# Patient Record
Sex: MALE | Race: WHITE | NOT HISPANIC OR LATINO | Employment: FULL TIME | URBAN - METROPOLITAN AREA
[De-identification: names, ages, dates, MRNs, and addresses within clinical notes are randomized per-mention and may not be internally consistent; named-entity substitution may affect disease eponyms.]

---

## 2021-11-08 ENCOUNTER — APPOINTMENT (OUTPATIENT)
Dept: GENERAL RADIOLOGY | Facility: HOSPITAL | Age: 41
End: 2021-11-08

## 2021-11-08 ENCOUNTER — HOSPITAL ENCOUNTER (INPATIENT)
Facility: HOSPITAL | Age: 41
LOS: 8 days | Discharge: HOME | End: 2021-11-16
Attending: EMERGENCY MEDICINE | Admitting: INTERNAL MEDICINE

## 2021-11-08 ENCOUNTER — APPOINTMENT (OUTPATIENT)
Dept: CT IMAGING | Facility: HOSPITAL | Age: 41
End: 2021-11-08

## 2021-11-08 DIAGNOSIS — Z87.891 FORMER SMOKER: ICD-10-CM

## 2021-11-08 DIAGNOSIS — U07.1 PNEUMONIA DUE TO COVID-19 VIRUS: Primary | ICD-10-CM

## 2021-11-08 DIAGNOSIS — I10 PRIMARY HYPERTENSION: ICD-10-CM

## 2021-11-08 DIAGNOSIS — J96.01 ACUTE RESPIRATORY FAILURE WITH HYPOXIA (HCC): ICD-10-CM

## 2021-11-08 DIAGNOSIS — J12.82 PNEUMONIA DUE TO COVID-19 VIRUS: Primary | ICD-10-CM

## 2021-11-08 DIAGNOSIS — R73.03 PREDIABETES: ICD-10-CM

## 2021-11-08 DIAGNOSIS — D89.839 CYTOKINE RELEASE SYNDROME, GRADE UNSPECIFIED: ICD-10-CM

## 2021-11-08 PROBLEM — E66.9 CLASS 2 OBESITY IN ADULT: Status: ACTIVE | Noted: 2021-11-08

## 2021-11-08 LAB
ALBUMIN SERPL-MCNC: 3.6 G/DL (ref 3.5–5.2)
ALBUMIN SERPL-MCNC: 4 G/DL (ref 3.5–5.2)
ALBUMIN/GLOB SERPL: 1.4 G/DL
ALP SERPL-CCNC: 64 U/L (ref 39–117)
ALP SERPL-CCNC: 74 U/L (ref 39–117)
ALT SERPL W P-5'-P-CCNC: 33 U/L (ref 1–41)
ALT SERPL W P-5'-P-CCNC: 35 U/L (ref 1–41)
ANION GAP SERPL CALCULATED.3IONS-SCNC: 15.5 MMOL/L (ref 5–15)
AST SERPL-CCNC: 39 U/L (ref 1–40)
AST SERPL-CCNC: 45 U/L (ref 1–40)
B PARAPERT DNA SPEC QL NAA+PROBE: NOT DETECTED
B PERT DNA SPEC QL NAA+PROBE: NOT DETECTED
BASOPHILS # BLD AUTO: 0.04 10*3/MM3 (ref 0–0.2)
BASOPHILS NFR BLD AUTO: 0.3 % (ref 0–1.5)
BILIRUB CONJ SERPL-MCNC: 0.2 MG/DL (ref 0–0.3)
BILIRUB INDIRECT SERPL-MCNC: 0.2 MG/DL
BILIRUB SERPL-MCNC: 0.4 MG/DL (ref 0–1.2)
BILIRUB SERPL-MCNC: 0.4 MG/DL (ref 0–1.2)
BUN SERPL-MCNC: 12 MG/DL (ref 6–20)
BUN/CREAT SERPL: 17.9 (ref 7–25)
C PNEUM DNA NPH QL NAA+NON-PROBE: NOT DETECTED
CALCIUM SPEC-SCNC: 8.7 MG/DL (ref 8.6–10.5)
CHLORIDE SERPL-SCNC: 104 MMOL/L (ref 98–107)
CO2 SERPL-SCNC: 23.5 MMOL/L (ref 22–29)
CREAT SERPL-MCNC: 0.67 MG/DL (ref 0.76–1.27)
CREAT SERPL-MCNC: 0.81 MG/DL (ref 0.76–1.27)
CRP SERPL-MCNC: 7.91 MG/DL (ref 0–0.5)
D DIMER PPP FEU-MCNC: 0.36 MCGFEU/ML (ref 0–0.49)
D-LACTATE SERPL-SCNC: 1.7 MMOL/L (ref 0.5–2)
D-LACTATE SERPL-SCNC: 2.5 MMOL/L (ref 0.5–2)
DEPRECATED RDW RBC AUTO: 41.3 FL (ref 37–54)
EOSINOPHIL # BLD AUTO: 0 10*3/MM3 (ref 0–0.4)
EOSINOPHIL NFR BLD AUTO: 0 % (ref 0.3–6.2)
ERYTHROCYTE [DISTWIDTH] IN BLOOD BY AUTOMATED COUNT: 12.6 % (ref 12.3–15.4)
FERRITIN SERPL-MCNC: 1577 NG/ML (ref 30–400)
FLUAV SUBTYP SPEC NAA+PROBE: NOT DETECTED
FLUBV RNA ISLT QL NAA+PROBE: NOT DETECTED
GFR SERPL CREATININE-BSD FRML MDRD: 105 ML/MIN/1.73
GFR SERPL CREATININE-BSD FRML MDRD: 130 ML/MIN/1.73
GLOBULIN UR ELPH-MCNC: 2.9 GM/DL
GLUCOSE SERPL-MCNC: 140 MG/DL (ref 65–99)
HADV DNA SPEC NAA+PROBE: NOT DETECTED
HCOV 229E RNA SPEC QL NAA+PROBE: NOT DETECTED
HCOV HKU1 RNA SPEC QL NAA+PROBE: NOT DETECTED
HCOV NL63 RNA SPEC QL NAA+PROBE: NOT DETECTED
HCOV OC43 RNA SPEC QL NAA+PROBE: NOT DETECTED
HCT VFR BLD AUTO: 46.7 % (ref 37.5–51)
HGB BLD-MCNC: 15.6 G/DL (ref 13–17.7)
HMPV RNA NPH QL NAA+NON-PROBE: NOT DETECTED
HPIV1 RNA SPEC QL NAA+PROBE: NOT DETECTED
HPIV2 RNA SPEC QL NAA+PROBE: NOT DETECTED
HPIV3 RNA NPH QL NAA+PROBE: NOT DETECTED
HPIV4 P GENE NPH QL NAA+PROBE: NOT DETECTED
IMM GRANULOCYTES # BLD AUTO: 0.26 10*3/MM3 (ref 0–0.05)
IMM GRANULOCYTES NFR BLD AUTO: 1.6 % (ref 0–0.5)
LDH SERPL-CCNC: 563 U/L (ref 135–225)
LYMPHOCYTES # BLD AUTO: 1.45 10*3/MM3 (ref 0.7–3.1)
LYMPHOCYTES NFR BLD AUTO: 9.1 % (ref 19.6–45.3)
M PNEUMO IGG SER IA-ACNC: NOT DETECTED
MCH RBC QN AUTO: 29.7 PG (ref 26.6–33)
MCHC RBC AUTO-ENTMCNC: 33.4 G/DL (ref 31.5–35.7)
MCV RBC AUTO: 89 FL (ref 79–97)
MONOCYTES # BLD AUTO: 0.93 10*3/MM3 (ref 0.1–0.9)
MONOCYTES NFR BLD AUTO: 5.8 % (ref 5–12)
NEUTROPHILS NFR BLD AUTO: 13.31 10*3/MM3 (ref 1.7–7)
NEUTROPHILS NFR BLD AUTO: 83.2 % (ref 42.7–76)
NRBC BLD AUTO-RTO: 0.1 /100 WBC (ref 0–0.2)
NT-PROBNP SERPL-MCNC: 134 PG/ML (ref 0–450)
PLATELET # BLD AUTO: 329 10*3/MM3 (ref 140–450)
PMV BLD AUTO: 9.3 FL (ref 6–12)
POTASSIUM SERPL-SCNC: 3.6 MMOL/L (ref 3.5–5.2)
PROCALCITONIN SERPL-MCNC: 0.04 NG/ML (ref 0–0.25)
PROCALCITONIN SERPL-MCNC: 0.07 NG/ML (ref 0–0.25)
PROT SERPL-MCNC: 6.4 G/DL (ref 6–8.5)
PROT SERPL-MCNC: 6.9 G/DL (ref 6–8.5)
QT INTERVAL: 353 MS
RBC # BLD AUTO: 5.25 10*6/MM3 (ref 4.14–5.8)
RHINOVIRUS RNA SPEC NAA+PROBE: NOT DETECTED
RSV RNA NPH QL NAA+NON-PROBE: NOT DETECTED
SARS-COV-2 RNA NPH QL NAA+NON-PROBE: DETECTED
SODIUM SERPL-SCNC: 143 MMOL/L (ref 136–145)
TROPONIN T SERPL-MCNC: <0.01 NG/ML (ref 0–0.03)
TROPONIN T SERPL-MCNC: <0.01 NG/ML (ref 0–0.03)
WBC # BLD AUTO: 15.99 10*3/MM3 (ref 3.4–10.8)

## 2021-11-08 PROCEDURE — 84145 PROCALCITONIN (PCT): CPT | Performed by: NURSE PRACTITIONER

## 2021-11-08 PROCEDURE — 86140 C-REACTIVE PROTEIN: CPT | Performed by: NURSE PRACTITIONER

## 2021-11-08 PROCEDURE — 71275 CT ANGIOGRAPHY CHEST: CPT

## 2021-11-08 PROCEDURE — 99285 EMERGENCY DEPT VISIT HI MDM: CPT

## 2021-11-08 PROCEDURE — 93005 ELECTROCARDIOGRAM TRACING: CPT | Performed by: EMERGENCY MEDICINE

## 2021-11-08 PROCEDURE — 85379 FIBRIN DEGRADATION QUANT: CPT | Performed by: NURSE PRACTITIONER

## 2021-11-08 PROCEDURE — 84145 PROCALCITONIN (PCT): CPT | Performed by: EMERGENCY MEDICINE

## 2021-11-08 PROCEDURE — 36415 COLL VENOUS BLD VENIPUNCTURE: CPT

## 2021-11-08 PROCEDURE — 85025 COMPLETE CBC W/AUTO DIFF WBC: CPT | Performed by: EMERGENCY MEDICINE

## 2021-11-08 PROCEDURE — 84484 ASSAY OF TROPONIN QUANT: CPT | Performed by: EMERGENCY MEDICINE

## 2021-11-08 PROCEDURE — 25010000002 ONDANSETRON PER 1 MG: Performed by: INTERNAL MEDICINE

## 2021-11-08 PROCEDURE — 63710000001 DEXAMETHASONE PER 0.25 MG: Performed by: NURSE PRACTITIONER

## 2021-11-08 PROCEDURE — 93010 ELECTROCARDIOGRAM REPORT: CPT | Performed by: INTERNAL MEDICINE

## 2021-11-08 PROCEDURE — 25010000002 ENOXAPARIN PER 10 MG: Performed by: NURSE PRACTITIONER

## 2021-11-08 PROCEDURE — 87040 BLOOD CULTURE FOR BACTERIA: CPT | Performed by: EMERGENCY MEDICINE

## 2021-11-08 PROCEDURE — 71045 X-RAY EXAM CHEST 1 VIEW: CPT

## 2021-11-08 PROCEDURE — 0202U NFCT DS 22 TRGT SARS-COV-2: CPT | Performed by: EMERGENCY MEDICINE

## 2021-11-08 PROCEDURE — 80053 COMPREHEN METABOLIC PANEL: CPT | Performed by: EMERGENCY MEDICINE

## 2021-11-08 PROCEDURE — 84484 ASSAY OF TROPONIN QUANT: CPT | Performed by: INTERNAL MEDICINE

## 2021-11-08 PROCEDURE — 25010000002 DEXAMETHASONE PER 1 MG: Performed by: EMERGENCY MEDICINE

## 2021-11-08 PROCEDURE — 83605 ASSAY OF LACTIC ACID: CPT | Performed by: EMERGENCY MEDICINE

## 2021-11-08 PROCEDURE — 83880 ASSAY OF NATRIURETIC PEPTIDE: CPT | Performed by: EMERGENCY MEDICINE

## 2021-11-08 PROCEDURE — 83615 LACTATE (LD) (LDH) ENZYME: CPT | Performed by: NURSE PRACTITIONER

## 2021-11-08 PROCEDURE — 25010000002 MORPHINE PER 10 MG: Performed by: INTERNAL MEDICINE

## 2021-11-08 PROCEDURE — 93005 ELECTROCARDIOGRAM TRACING: CPT | Performed by: INTERNAL MEDICINE

## 2021-11-08 PROCEDURE — 82565 ASSAY OF CREATININE: CPT | Performed by: NURSE PRACTITIONER

## 2021-11-08 PROCEDURE — 80076 HEPATIC FUNCTION PANEL: CPT | Performed by: NURSE PRACTITIONER

## 2021-11-08 PROCEDURE — 82728 ASSAY OF FERRITIN: CPT | Performed by: NURSE PRACTITIONER

## 2021-11-08 PROCEDURE — XW033E5 INTRODUCTION OF REMDESIVIR ANTI-INFECTIVE INTO PERIPHERAL VEIN, PERCUTANEOUS APPROACH, NEW TECHNOLOGY GROUP 5: ICD-10-PCS | Performed by: INTERNAL MEDICINE

## 2021-11-08 PROCEDURE — 0 IOPAMIDOL PER 1 ML: Performed by: EMERGENCY MEDICINE

## 2021-11-08 RX ORDER — DEXAMETHASONE SODIUM PHOSPHATE 10 MG/ML
6 INJECTION INTRAMUSCULAR; INTRAVENOUS ONCE
Status: COMPLETED | OUTPATIENT
Start: 2021-11-08 | End: 2021-11-08

## 2021-11-08 RX ORDER — ONDANSETRON 2 MG/ML
4 INJECTION INTRAMUSCULAR; INTRAVENOUS EVERY 6 HOURS PRN
Status: DISCONTINUED | OUTPATIENT
Start: 2021-11-08 | End: 2021-11-16 | Stop reason: HOSPADM

## 2021-11-08 RX ORDER — GABAPENTIN 100 MG/1
100 CAPSULE ORAL 3 TIMES DAILY
Status: DISCONTINUED | OUTPATIENT
Start: 2021-11-08 | End: 2021-11-16 | Stop reason: HOSPADM

## 2021-11-08 RX ORDER — BUDESONIDE AND FORMOTEROL FUMARATE DIHYDRATE 160; 4.5 UG/1; UG/1
2 AEROSOL RESPIRATORY (INHALATION)
Status: DISCONTINUED | OUTPATIENT
Start: 2021-11-08 | End: 2021-11-16 | Stop reason: HOSPADM

## 2021-11-08 RX ORDER — GABAPENTIN 100 MG/1
100 CAPSULE ORAL 3 TIMES DAILY
COMMUNITY

## 2021-11-08 RX ORDER — PANTOPRAZOLE SODIUM 40 MG/10ML
40 INJECTION, POWDER, LYOPHILIZED, FOR SOLUTION INTRAVENOUS
Status: DISCONTINUED | OUTPATIENT
Start: 2021-11-09 | End: 2021-11-10

## 2021-11-08 RX ORDER — ATORVASTATIN CALCIUM 20 MG/1
20 TABLET, FILM COATED ORAL NIGHTLY
Status: DISCONTINUED | OUTPATIENT
Start: 2021-11-08 | End: 2021-11-16 | Stop reason: HOSPADM

## 2021-11-08 RX ORDER — ACETAMINOPHEN 500 MG
1000 TABLET ORAL ONCE
Status: COMPLETED | OUTPATIENT
Start: 2021-11-08 | End: 2021-11-08

## 2021-11-08 RX ORDER — MORPHINE SULFATE 2 MG/ML
2 INJECTION, SOLUTION INTRAMUSCULAR; INTRAVENOUS EVERY 4 HOURS PRN
Status: DISPENSED | OUTPATIENT
Start: 2021-11-08 | End: 2021-11-15

## 2021-11-08 RX ORDER — ATORVASTATIN CALCIUM 20 MG/1
20 TABLET, FILM COATED ORAL NIGHTLY
COMMUNITY

## 2021-11-08 RX ORDER — METOPROLOL TARTRATE 50 MG/1
50 TABLET, FILM COATED ORAL EVERY 12 HOURS
Status: DISCONTINUED | OUTPATIENT
Start: 2021-11-08 | End: 2021-11-16 | Stop reason: HOSPADM

## 2021-11-08 RX ORDER — NITROGLYCERIN 0.4 MG/1
0.4 TABLET SUBLINGUAL
Status: DISCONTINUED | OUTPATIENT
Start: 2021-11-08 | End: 2021-11-16 | Stop reason: HOSPADM

## 2021-11-08 RX ORDER — CYCLOBENZAPRINE HCL 5 MG
5 TABLET ORAL NIGHTLY PRN
COMMUNITY

## 2021-11-08 RX ORDER — CHOLECALCIFEROL (VITAMIN D3) 125 MCG
5 CAPSULE ORAL NIGHTLY PRN
Status: DISCONTINUED | OUTPATIENT
Start: 2021-11-08 | End: 2021-11-16 | Stop reason: HOSPADM

## 2021-11-08 RX ORDER — CYCLOBENZAPRINE HCL 10 MG
5 TABLET ORAL NIGHTLY PRN
Status: DISCONTINUED | OUTPATIENT
Start: 2021-11-08 | End: 2021-11-16 | Stop reason: HOSPADM

## 2021-11-08 RX ORDER — ACETAMINOPHEN 325 MG/1
650 TABLET ORAL EVERY 6 HOURS PRN
Status: DISCONTINUED | OUTPATIENT
Start: 2021-11-08 | End: 2021-11-16 | Stop reason: HOSPADM

## 2021-11-08 RX ORDER — METOPROLOL TARTRATE 50 MG/1
50 TABLET, FILM COATED ORAL EVERY 12 HOURS
COMMUNITY

## 2021-11-08 RX ORDER — AMLODIPINE BESYLATE 10 MG/1
10 TABLET ORAL DAILY
COMMUNITY
End: 2021-11-16 | Stop reason: HOSPADM

## 2021-11-08 RX ADMIN — DEXAMETHASONE SODIUM PHOSPHATE 6 MG: 10 INJECTION, SOLUTION INTRAMUSCULAR; INTRAVENOUS at 09:16

## 2021-11-08 RX ADMIN — ATORVASTATIN CALCIUM 20 MG: 20 TABLET, FILM COATED ORAL at 20:13

## 2021-11-08 RX ADMIN — METOPROLOL TARTRATE 50 MG: 50 TABLET, FILM COATED ORAL at 19:14

## 2021-11-08 RX ADMIN — DEXAMETHASONE 6 MG: 4 TABLET ORAL at 20:13

## 2021-11-08 RX ADMIN — IOPAMIDOL 100 ML: 755 INJECTION, SOLUTION INTRAVENOUS at 10:57

## 2021-11-08 RX ADMIN — MORPHINE SULFATE 2 MG: 2 INJECTION, SOLUTION INTRAMUSCULAR; INTRAVENOUS at 20:14

## 2021-11-08 RX ADMIN — ENOXAPARIN SODIUM 40 MG: 40 INJECTION SUBCUTANEOUS at 14:27

## 2021-11-08 RX ADMIN — NITROGLYCERIN 0.4 MG: 0.4 TABLET SUBLINGUAL at 19:14

## 2021-11-08 RX ADMIN — ONDANSETRON 4 MG: 2 INJECTION INTRAMUSCULAR; INTRAVENOUS at 20:14

## 2021-11-08 RX ADMIN — ACETAMINOPHEN 650 MG: 325 TABLET, FILM COATED ORAL at 20:13

## 2021-11-08 RX ADMIN — GABAPENTIN 100 MG: 100 CAPSULE ORAL at 20:13

## 2021-11-08 RX ADMIN — REMDESIVIR 200 MG: 100 INJECTION, POWDER, LYOPHILIZED, FOR SOLUTION INTRAVENOUS at 14:35

## 2021-11-08 RX ADMIN — ACETAMINOPHEN 1000 MG: 500 TABLET, FILM COATED ORAL at 09:14

## 2021-11-08 RX ADMIN — SODIUM CHLORIDE, POTASSIUM CHLORIDE, SODIUM LACTATE AND CALCIUM CHLORIDE 500 ML: 600; 310; 30; 20 INJECTION, SOLUTION INTRAVENOUS at 09:20

## 2021-11-08 NOTE — H&P
Patient Name:  Flo Willoughby  YOB: 1980  MRN:  [id number]  Admit Date:  11/8/2021  Patient Care Team:  Provider, No Known as PCP - General      Subjective   History Present Illness     Chief Complaint   Patient presents with   • Exposure To Known Illness   • Cough   • Shortness of Breath       Mr. Willoughby is a 41 y.o. former smoker with a history of HTN, obesity, CAD that presents to [facility] complaining of SOB, cough.  Symptoms started approximately 10/28 and were initially mild with body aches fatigue and loss of smell and taste.  He tested positive for COVID-19 9 days ago.  In the last week his shortness of breath has become progressively worse and exacerbated by exertion.  His oxygen saturations at home were 84% last night and 79% over the weekend.  Associated symptoms include fever, cough, fatigue, soreness of left chest worse with coughing and deep breathing.  He denies chest pain, palpitations, nausea, vomiting, diarrhea, abdominal pain, lower extremity pain or swelling.  He has not been vaccinated against COVID-19.       History of Present Illness  Review of Systems   Constitutional: Positive for activity change, appetite change, chills, fatigue and fever.   HENT: Negative for congestion and trouble swallowing.    Respiratory: Positive for cough, chest tightness and shortness of breath. Negative for choking.    Cardiovascular: Negative for chest pain, palpitations and leg swelling.   Gastrointestinal: Negative for abdominal distention, abdominal pain, blood in stool, constipation, diarrhea, nausea and vomiting.   Genitourinary: Negative for dysuria.   Musculoskeletal: Negative for back pain.   Skin: Negative for pallor.   Neurological: Positive for headaches. Negative for dizziness and weakness.   Hematological: Does not bruise/bleed easily.   Psychiatric/Behavioral: Negative for confusion.        Personal History     Past Medical History:   Diagnosis Date   • Hyperlipidemia     • Hypertension      History reviewed. No pertinent surgical history.  History reviewed. No pertinent family history.  Social History     Tobacco Use   • Smoking status: Not on file   • Smokeless tobacco: Not on file   Substance Use Topics   • Alcohol use: Not on file   • Drug use: Not on file     No current facility-administered medications on file prior to encounter.     Current Outpatient Medications on File Prior to Encounter   Medication Sig Dispense Refill   • cyclobenzaprine (FLEXERIL) 5 MG tablet Take 5 mg by mouth At Night As Needed for Muscle Spasms. Patient takes 1-2 tablets at bedtime as needed     • gabapentin (NEURONTIN) 100 MG capsule Take 100 mg by mouth 3 (Three) Times a Day.     • amLODIPine (NORVASC) 10 MG tablet Take 10 mg by mouth Daily.     • atorvastatin (LIPITOR) 20 MG tablet Take 20 mg by mouth Every Night.     • metoprolol tartrate (LOPRESSOR) 50 MG tablet Take 50 mg by mouth Every 12 (Twelve) Hours.       Allergies   Allergen Reactions   • Azithromycin Shortness Of Breath     HIVES AND DIFFICULTY BREATHING       Objective    Objective     Vital Signs  Temp:  [98.9 °F (37.2 °C)-101.8 °F (38.8 °C)] 98.9 °F (37.2 °C)  Heart Rate:  [] 87  Resp:  [18-24] 20  BP: (110-156)/(60-94) 118/60  SpO2:  [87 %-95 %] 95 %  on  Flow (L/min):  [6] 6;   Device (Oxygen Therapy): nasal cannula  Body mass index is 37.12 kg/m².    Physical Exam  Vitals and nursing note reviewed.   Constitutional:       Appearance: He is well-developed. He is obese. He is ill-appearing.   HENT:      Head: Normocephalic and atraumatic.   Eyes:      Conjunctiva/sclera: Conjunctivae normal.   Neck:      Trachea: No tracheal deviation.   Cardiovascular:      Rate and Rhythm: Normal rate and regular rhythm.   Pulmonary:      Effort: Pulmonary effort is normal. No respiratory distress.      Breath sounds: Rhonchi present.      Comments: 95% on 5L NC, decreased breath sounds  Abdominal:      General: Bowel sounds are normal.  There is no distension.      Palpations: Abdomen is soft. There is no mass.      Tenderness: There is no abdominal tenderness. There is no guarding or rebound.   Musculoskeletal:         General: Normal range of motion.      Cervical back: Normal range of motion.      Right lower leg: No edema.      Left lower leg: No edema.   Skin:     General: Skin is warm and dry.   Neurological:      General: No focal deficit present.      Mental Status: He is alert and oriented to person, place, and time.   Psychiatric:         Mood and Affect: Mood normal.         Behavior: Behavior normal.         Results Review:  I reviewed the patient's new clinical results.  I reviewed the patient's new imaging results and agree with the interpretation.  I reviewed the patient's other test results and agree with the interpretation  I personally viewed and interpreted the patient's EKG/Telemetry data  Discussed with ED provider.    Lab Results (last 24 hours)     Procedure Component Value Units Date/Time    CBC & Differential [[id number]]  (Abnormal) Collected: 11/08/21 0910    Specimen: Blood Updated: 11/08/21 0933    Narrative:      The following orders were created for panel order CBC & Differential.  Procedure                               Abnormality         Status                     ---------                               -----------         ------                     CBC Auto Differential[[id number]]        Abnormal            Final result                 Please view results for these tests on the individual orders.    Comprehensive Metabolic Panel [[id number]]  (Abnormal) Collected: 11/08/21 0910    Specimen: Blood Updated: 11/08/21 0947     Glucose 140 mg/dL      BUN 12 mg/dL      Creatinine 0.67 mg/dL      Sodium 143 mmol/L      Potassium 3.6 mmol/L      Chloride 104 mmol/L      CO2 23.5 mmol/L      Calcium 8.7 mg/dL      Total Protein 6.9 g/dL      Albumin 4.00 g/dL      ALT (SGPT) 35 U/L      AST (SGOT) 45 U/L      Alkaline  "Phosphatase 74 U/L      Total Bilirubin 0.4 mg/dL      eGFR Non African Amer 130 mL/min/1.73      Globulin 2.9 gm/dL      A/G Ratio 1.4 g/dL      BUN/Creatinine Ratio 17.9     Anion Gap 15.5 mmol/L     Narrative:      GFR Normal >60  Chronic Kidney Disease <60  Kidney Failure <15      Procalcitonin [[id number]]  (Normal) Collected: 11/08/21 0910    Specimen: Blood Updated: 11/08/21 0953     Procalcitonin 0.04 ng/mL     Narrative:      As a Marker for Sepsis (Non-Neonates):     1. <0.5 ng/mL represents a low risk of severe sepsis and/or septic shock.  2. >2 ng/mL represents a high risk of severe sepsis and/or septic shock.    As a Marker for Lower Respiratory Tract Infections that require antibiotic therapy:  PCT on Admission     Antibiotic Therapy             6-12 Hrs later  >0.5                          Strongly Recommended            >0.25 - <0.5             Recommended  0.1 - 0.25                  Discouraged                       Remeasure/reassess PCT  <0.1                         Strongly Discouraged         Remeasure/reassess PCT      As 28 day mortality risk marker: \"Change in Procalcitonin Result\" (>80% or <=80%) if Day 0 (or Day 1) and Day 4 values are available. Refer to http://www.[employer] Holdingpct-calculator.com/    Change in PCT <=80 %   A decrease of PCT levels below or equal to 80% defines a positive change in PCT test result representing a higher risk for 28-day all-cause mortality of patients diagnosed with severe sepsis or septic shock.    Change in PCT >80 %   A decrease of PCT levels of more than 80% defines a negative change in PCT result representing a lower risk for 28-day all-cause mortality of patients diagnosed with severe sepsis or septic shock.                Lactic Acid, Plasma [[id number]]  (Abnormal) Collected: 11/08/21 0910    Specimen: Blood Updated: 11/08/21 0956     Lactate 2.5 mmol/L     Blood Culture - Blood, Arm, Left [[id number]] Collected: 11/08/21 0910    Specimen: Blood from Arm, " Left Updated: 11/08/21 0921    Blood Culture - Blood, Arm, Right [[id number]] Collected: 11/08/21 0910    Specimen: Blood from Arm, Right Updated: 11/08/21 0921    BNP [[id number]]  (Normal) Collected: 11/08/21 0910    Specimen: Blood Updated: 11/08/21 0941     proBNP 134.0 pg/mL     Narrative:      Among patients with dyspnea, NT-proBNP is highly sensitive for the detection of acute congestive heart failure. In addition NT-proBNP of <300 pg/ml effectively rules out acute congestive heart failure with 99% negative predictive value.    Results may be falsely decreased if patient taking Biotin.      Troponin [[id number]]  (Normal) Collected: 11/08/21 0910    Specimen: Blood Updated: 11/08/21 0953     Troponin T <0.010 ng/mL     Narrative:      Troponin T Reference Range:  <= 0.03 ng/mL-   Negative for AMI  >0.03 ng/mL-     Abnormal for myocardial necrosis.  Clinicians would have to utilize clinical acumen, EKG, Troponin and serial changes to determine if it is an Acute Myocardial Infarction or myocardial injury due to an underlying chronic condition.       Results may be falsely decreased if patient taking Biotin.      CBC Auto Differential [[id number]]  (Abnormal) Collected: 11/08/21 0910    Specimen: Blood Updated: 11/08/21 0933     WBC 15.99 10*3/mm3      RBC 5.25 10*6/mm3      Hemoglobin 15.6 g/dL      Hematocrit 46.7 %      MCV 89.0 fL      MCH 29.7 pg      MCHC 33.4 g/dL      RDW 12.6 %      RDW-SD 41.3 fl      MPV 9.3 fL      Platelets 329 10*3/mm3      Neutrophil % 83.2 %      Lymphocyte % 9.1 %      Monocyte % 5.8 %      Eosinophil % 0.0 %      Basophil % 0.3 %      Immature Grans % 1.6 %      Neutrophils, Absolute 13.31 10*3/mm3      Lymphocytes, Absolute 1.45 10*3/mm3      Monocytes, Absolute 0.93 10*3/mm3      Eosinophils, Absolute 0.00 10*3/mm3      Basophils, Absolute 0.04 10*3/mm3      Immature Grans, Absolute 0.26 10*3/mm3      nRBC 0.1 /100 WBC     Ferritin [[id number]]  (Abnormal) Collected: 11/08/21 0910     Specimen: Blood Updated: 11/08/21 1359     Ferritin 1,577.00 ng/mL     Narrative:      Results may be falsely decreased if patient taking Biotin.      Lactate Dehydrogenase [[id number]]  (Abnormal) Collected: 11/08/21 0910    Specimen: Blood Updated: 11/08/21 1349      U/L     C-reactive Protein [[id number]]  (Abnormal) Collected: 11/08/21 0910    Specimen: Blood Updated: 11/08/21 1349     C-Reactive Protein 7.91 mg/dL     Respiratory Panel PCR w/COVID-19(SARS-CoV-2) MICHELINE/VALENTINA/ALLEN/PAD/COR/MAD/ARIE In-House, NP Swab in UTM/VTM, 3-4 HR TAT - Swab, Nasopharynx [[id number]]  (Abnormal) Collected: 11/08/21 0914    Specimen: Swab from Nasopharynx Updated: 11/08/21 1024     ADENOVIRUS, PCR Not Detected     Coronavirus 229E Not Detected     Coronavirus HKU1 Not Detected     Coronavirus NL63 Not Detected     Coronavirus OC43 Not Detected     COVID19 Detected     Human Metapneumovirus Not Detected     Human Rhinovirus/Enterovirus Not Detected     Influenza A PCR Not Detected     Influenza B PCR Not Detected     Parainfluenza Virus 1 Not Detected     Parainfluenza Virus 2 Not Detected     Parainfluenza Virus 3 Not Detected     Parainfluenza Virus 4 Not Detected     RSV, PCR Not Detected     Bordetella pertussis pcr Not Detected     Bordetella parapertussis PCR Not Detected     Chlamydophila pneumoniae PCR Not Detected     Mycoplasma pneumo by PCR Not Detected    Narrative:      In the setting of a positive respiratory panel with a viral infection PLUS a negative procalcitonin without other underlying concern for bacterial infection, consider observing off antibiotics or discontinuation of antibiotics and continue supportive care. If the respiratory panel is positive for atypical bacterial infection (Bordetella pertussis, Chlamydophila pneumoniae, or Mycoplasma pneumoniae), consider antibiotic de-escalation to target atypical bacterial infection.    STAT Lactic Acid, Reflex [[id number]]  (Normal) Collected: 11/08/21 1431     Specimen: Blood Updated: 11/08/21 1502     Lactate 1.7 mmol/L     Hepatic Function Panel [[id number]] Collected: 11/08/21 1431    Specimen: Blood Updated: 11/08/21 1440    Creatinine, Serum [[id number]] Collected: 11/08/21 1431    Specimen: Blood Updated: 11/08/21 1440    D-dimer, Quantitative [[id number]]  (Normal) Collected: 11/08/21 1431    Specimen: Blood Updated: 11/08/21 1502     D-Dimer, Quantitative 0.36 MCGFEU/mL     Narrative:      The Stago D-Dimer test used in conjunction with a clinical pretest probability (PTP) assessment model, has been approved by the FDA to rule out the presence of venous thromboembolism (VTE) in outpatients suspected of deep venous thrombosis (DVT) or pulmonary embolism (PE). The cut-off for negative predictive value is <0.50 MCGFEU/mL.    Procalcitonin [[id number]] Collected: 11/08/21 1431    Specimen: Blood Updated: 11/08/21 1440          Imaging Results (Last 24 Hours)     Procedure Component Value Units Date/Time    CT Angiogram Chest [[id number]] Collected: 11/08/21 1206     Updated: 11/08/21 1206    Narrative:      CT ANGIOGRAM CHEST WITH CONTRAST, PULMONARY EMBOLISM PROTOCOL     HISTORY: 41-year-old male with history of  COVID, evaluate for pulmonary  embolism. Hypoxia.     TECHNIQUE: Spiral CT images were obtained from the lung apices to the  diaphragmatic domes following administration of intravenous contrast in  the angiographic phase. Coronal, sagittal and 3-D volume rendered  reformats were then obtained.     COMPARISON: None.      FINDINGS: Slight delay in obtaining images with decreased opacification  within the pulmonary arteries. This limits evaluation of segmental  arteries. There are no convincing evidence of pulmonary artery  thromboembolism. Thoracic aorta is normal without evidence of  dissection. No pleural or pericardial effusion is seen. Mildly enlarged  mediastinal and hilar lymphadenopathy is seen. Index subcarinal lymph  node measures up to 1.1 cm in short  axis dimension. A right infrahilar  lymph node measures up to 9 mm in short axis dimension. A left hilar  lymph node measures up to 1.1 cm in short axis dimension. The central  airways are patent without endobronchial lesion. There are areas of both  peripheral and central ground glass as well as consolidative opacity in  a distribution consistent with COVID-19 pneumonia. Background  emphysematous changes are present.        Impression:      Slight delay in obtaining images leading to reduced  opacification within the pulmonary arterial branches however there is no  convincing evidence of pulmonary artery thromboembolism. CT findings  suggestive of COVID-19 pneumonia. Mildly enlarged mediastinal and hilar  lymph nodes are favored to be reactive.     These findings were discussed with Dr. Lange by telephone at the time  of dictation.     Radiation dose reduction techniques were utilized, including automated  exposure control and exposure modulation based on body size.          XR Chest AP [[id number]] Collected: 11/08/21 0938     Updated: 11/08/21 0942    Narrative:      PORTABLE CHEST 11/08/2021  AM     CLINICAL HISTORY: COVID evaluation. Cough. Fever.     The lungs are fairly well-expanded and appear free of infiltrates. There  is minimal atelectasis at the right lung base. There are no pleural  effusions. The heart is at the upper limits of normal in size.     IMPRESSIONS: No evidence of acute disease within the chest.     This report was finalized on 11/8/2021 9:39 AM by Dr. Yonny Wray M.D.           COVID LABS:  Results From Last 14 Days   Lab Units 11/08/21  1431 11/08/21  0910   PROBNP pg/mL  --  134.0   CRP mg/dL  --  7.91*   D DIMER QUANT MCGFEU/mL 0.36  --    FERRITIN ng/mL  --  1,577.00*   LACTATE mmol/L 1.7 2.5*   LDH U/L  --  563*   PROCALCITONIN ng/mL 0.07 0.04   TROPONIN T ng/mL  --  <0.010         ECG 12 Lead   Final Result   HEART RATE= 101  bpm   RR Interval= 592  ms   MO Interval= 136   ms   P Horizontal Axis= 16  deg   P Front Axis= 37  deg   QRSD Interval= 94  ms   QT Interval= 353  ms   QRS Axis= 12  deg   T Wave Axis= -9  deg   - OTHERWISE NORMAL ECG -   Sinus tachycardia   NON-SPECIFIC ST-T WAVE CHANGES   NO PRIOR TRACING AVAILABLE FOR COMPARISON   Electronically Signed By: Shawn Mckenna ([facility]) 08-Nov-2021 14:24:46   Date and Time of Study: 2021-11-08 09:02:01           Assessment/Plan     Active Hospital Problems    Diagnosis  POA   • Pneumonia due to COVID-19 virus [U07.1, J12.82]  Yes   • Acute respiratory failure with hypoxia (HCC) [J96.01]  Yes   • HTN (hypertension) [I10]  Yes   • Former smoker [Z87.891]  Not Applicable   • Class 2 obesity in adult [E66.9]  Unknown      Resolved Hospital Problems   No resolved problems to display.       41 y.o. male admitted with moderate COVID19 PNA with hypoxia. Onset of symptoms was 11 days ago.  Presentation is complicated by acute hypoxic respiratory failure.  He is currently requiring 6 L/m of oxygen via NC to achieve SpO2 of 95%.  CTA chest without PE, mediastinal and hilar lymph node enlarged likely reactive,  pneumonia    Plan to admit to telemetry unit for close monitoring and treatment.    He has the following risk factors for worse outcomes due to COVID 19 infection:   HTN  Cardiac Diseases  Obesity (BMI > = 30)  former smoker  Will trend inflammatory markers.  Given current oxygen requirements will initiate the following:  Dexamethasone 6 mg IV/PO daily for up to 10 days or until hospital discharge, whichever comes first.  Remdesivir 200 mg IV for one day, followed by 100 mg IV daily for 4 days or until hospital discharge, whichever comes first. He is outside the 10 day window but will discuss with ID benefit of therapy.   WBC elevated with normal procalcitonin.  Hold on antibiotics at this time but consider treatment for secondary bacterial pneumonia if he fails to improve   Routine incentive spirometry.  Pharmacy to dose Lovenox for DVT  prophylaxis.  COPD/ former smoker- Emphysematous changes on CT. Add symbicort     HTN- BP acceptable.  Home norvasc and Metoprolol with holding parameter     HLD- statin       Discussed with patient, pharmacist, ED provider    RULA Montgomery  [facility]  11/08/21  15:06 EST

## 2021-11-08 NOTE — ED PROVIDER NOTES
EMERGENCY DEPARTMENT ENCOUNTER    Room Number:  15/15  Date of encounter:  11/8/2021  PCP: Provider, No Known  Historian: Patient     I used full protective equipment while examining this patient.  This includes face mask, gloves and protective eyewear.  I washed my hands before entering the room and immediately upon leaving the room.  Patient was wearing a surgical mask.      HPI:  Chief Complaint: Shortness of breath  A complete HPI/ROS/PMH/PSH/SH/FH are unobtainable due to: None    Context: Flo Willoughby is a 41 y.o. male, with a history of hypertension, who presents to the ED from home by EMS c/o shortness of breath for the past 1.5 weeks.  Patient tested positive for Covid 9 days ago.  States shortness of breath has worsened over the past several days.  Symptoms are worse with even minimal exertion.  Patient has been checking his O2 sats at home and states he got a reading of 84% last night.  Reports intermittent fever, productive cough, body aches, loss of smell/taste, and fatigue.  Also reports of intermittent soreness of his left chest that is worse with coughing.  Denies photophobia, abdominal pain, vomiting, or dysuria.  Patient is not vaccinated for Covid.  He quit smoking approximately 1 year ago.  Denies any history of heart or lung disease.  Patient had an O2 sat of 84% on room air at triage.      PAST MEDICAL HISTORY  Active Ambulatory Problems     Diagnosis Date Noted   • No Active Ambulatory Problems     Resolved Ambulatory Problems     Diagnosis Date Noted   • No Resolved Ambulatory Problems     Past Medical History:   Diagnosis Date   • Hyperlipidemia    • Hypertension          PAST SURGICAL HISTORY  History reviewed. No pertinent surgical history.      FAMILY HISTORY  History reviewed. No pertinent family history.      SOCIAL HISTORY  Social History     Socioeconomic History   • Marital status: Single         ALLERGIES  Azithromycin       REVIEW OF SYSTEMS  Review of Systems      All systems have  been reviewed and are negative except as as discussed in the HPI    PHYSICAL EXAM    I have reviewed the triage vital signs and nursing notes.    ED Triage Vitals   Temp Heart Rate Resp BP SpO2   11/08/21 0815 11/08/21 0815 11/08/21 0820 -- 11/08/21 0815   (!) 101.8 °F (38.8 °C) 105 24  (!) 89 %      Temp src Heart Rate Source Patient Position BP Location FiO2 (%)   11/08/21 0815 -- -- -- --   Tympanic           Physical Exam  GENERAL: Awake, alert, oriented x3, mildly ill-appearing  HENT: NCAT, nares patent, moist mucous membranes, oropharynx is benign  NECK: supple, no meningismus  EYES: Extraocular muscles intact, no scleral icterus  CV: regular rhythm, tachycardic  RESPIRATORY: Mildly tachypneic, diminished breath sounds in both lung bases  ABDOMEN: soft, nontender  MUSCULOSKELETAL: Extremities are nontender and without obvious deformity.  There is normal range of motion in all extremities.  There is no calf tenderness or pedal edema  NEURO: Strength, sensation, and coordination are grossly intact.  Speech and mentation are unremarkable.  No facial droop.  SKIN: warm, dry, no rash  PSYCH: Normal mood and affect      LAB RESULTS  Recent Results (from the past 24 hour(s))   ECG 12 Lead    Collection Time: 11/08/21  9:02 AM   Result Value Ref Range    QT Interval 353 ms   Comprehensive Metabolic Panel    Collection Time: 11/08/21  9:10 AM    Specimen: Blood   Result Value Ref Range    Glucose 140 (H) 65 - 99 mg/dL    BUN 12 6 - 20 mg/dL    Creatinine 0.67 (L) 0.76 - 1.27 mg/dL    Sodium 143 136 - 145 mmol/L    Potassium 3.6 3.5 - 5.2 mmol/L    Chloride 104 98 - 107 mmol/L    CO2 23.5 22.0 - 29.0 mmol/L    Calcium 8.7 8.6 - 10.5 mg/dL    Total Protein 6.9 6.0 - 8.5 g/dL    Albumin 4.00 3.50 - 5.20 g/dL    ALT (SGPT) 35 1 - 41 U/L    AST (SGOT) 45 (H) 1 - 40 U/L    Alkaline Phosphatase 74 39 - 117 U/L    Total Bilirubin 0.4 0.0 - 1.2 mg/dL    eGFR Non African Amer 130 >60 mL/min/1.73    Globulin 2.9 gm/dL    A/G  Ratio 1.4 g/dL    BUN/Creatinine Ratio 17.9 7.0 - 25.0    Anion Gap 15.5 (H) 5.0 - 15.0 mmol/L   Procalcitonin    Collection Time: 11/08/21  9:10 AM    Specimen: Blood   Result Value Ref Range    Procalcitonin 0.04 0.00 - 0.25 ng/mL   Lactic Acid, Plasma    Collection Time: 11/08/21  9:10 AM    Specimen: Blood   Result Value Ref Range    Lactate 2.5 (C) 0.5 - 2.0 mmol/L   BNP    Collection Time: 11/08/21  9:10 AM    Specimen: Blood   Result Value Ref Range    proBNP 134.0 0.0 - 450.0 pg/mL   Troponin    Collection Time: 11/08/21  9:10 AM    Specimen: Blood   Result Value Ref Range    Troponin T <0.010 0.000 - 0.030 ng/mL   CBC Auto Differential    Collection Time: 11/08/21  9:10 AM    Specimen: Blood   Result Value Ref Range    WBC 15.99 (H) 3.40 - 10.80 10*3/mm3    RBC 5.25 4.14 - 5.80 10*6/mm3    Hemoglobin 15.6 13.0 - 17.7 g/dL    Hematocrit 46.7 37.5 - 51.0 %    MCV 89.0 79.0 - 97.0 fL    MCH 29.7 26.6 - 33.0 pg    MCHC 33.4 31.5 - 35.7 g/dL    RDW 12.6 12.3 - 15.4 %    RDW-SD 41.3 37.0 - 54.0 fl    MPV 9.3 6.0 - 12.0 fL    Platelets 329 140 - 450 10*3/mm3    Neutrophil % 83.2 (H) 42.7 - 76.0 %    Lymphocyte % 9.1 (L) 19.6 - 45.3 %    Monocyte % 5.8 5.0 - 12.0 %    Eosinophil % 0.0 (L) 0.3 - 6.2 %    Basophil % 0.3 0.0 - 1.5 %    Immature Grans % 1.6 (H) 0.0 - 0.5 %    Neutrophils, Absolute 13.31 (H) 1.70 - 7.00 10*3/mm3    Lymphocytes, Absolute 1.45 0.70 - 3.10 10*3/mm3    Monocytes, Absolute 0.93 (H) 0.10 - 0.90 10*3/mm3    Eosinophils, Absolute 0.00 0.00 - 0.40 10*3/mm3    Basophils, Absolute 0.04 0.00 - 0.20 10*3/mm3    Immature Grans, Absolute 0.26 (H) 0.00 - 0.05 10*3/mm3    nRBC 0.1 0.0 - 0.2 /100 WBC   Respiratory Panel PCR w/COVID-19(SARS-CoV-2) [facility] In-House, NP Swab in [facility], 3-4 HR TAT - Swab, Nasopharynx    Collection Time: 11/08/21  9:14 AM    Specimen: Nasopharynx; Swab   Result Value Ref Range    ADENOVIRUS, PCR Not Detected Not Detected    Coronavirus 229E Not Detected  Not Detected    Coronavirus HKU1 Not Detected Not Detected    Coronavirus NL63 Not Detected Not Detected    Coronavirus OC43 Not Detected Not Detected    COVID19 Detected (C) Not Detected - Ref. Range    Human Metapneumovirus Not Detected Not Detected    Human Rhinovirus/Enterovirus Not Detected Not Detected    Influenza A PCR Not Detected Not Detected    Influenza B PCR Not Detected Not Detected    Parainfluenza Virus 1 Not Detected Not Detected    Parainfluenza Virus 2 Not Detected Not Detected    Parainfluenza Virus 3 Not Detected Not Detected    Parainfluenza Virus 4 Not Detected Not Detected    RSV, PCR Not Detected Not Detected    Bordetella pertussis pcr Not Detected Not Detected    Bordetella parapertussis PCR Not Detected Not Detected    Chlamydophila pneumoniae PCR Not Detected Not Detected    Mycoplasma pneumo by PCR Not Detected Not Detected       Ordered the above labs and independently reviewed the results.      RADIOLOGY  CT Angiogram Chest    Result Date: 11/8/2021  CT ANGIOGRAM CHEST WITH CONTRAST, PULMONARY EMBOLISM PROTOCOL  HISTORY: 41-year-old male with history of  COVID, evaluate for pulmonary embolism. Hypoxia.  TECHNIQUE: Spiral CT images were obtained from the lung apices to the diaphragmatic domes following administration of intravenous contrast in the angiographic phase. Coronal, sagittal and 3-D volume rendered reformats were then obtained.  COMPARISON: None.  FINDINGS: Slight delay in obtaining images with decreased opacification within the pulmonary arteries. This limits evaluation of segmental arteries. There are no convincing evidence of pulmonary artery thromboembolism. Thoracic aorta is normal without evidence of dissection. No pleural or pericardial effusion is seen. Mildly enlarged mediastinal and hilar lymphadenopathy is seen. Index subcarinal lymph node measures up to 1.1 cm in short axis dimension. A right infrahilar lymph node measures up to 9 mm in short axis dimension. A  left hilar lymph node measures up to 1.1 cm in short axis dimension. The central airways are patent without endobronchial lesion. There are areas of both peripheral and central ground glass as well as consolidative opacity in a distribution consistent with COVID-19 pneumonia. Background emphysematous changes are present.      Slight delay in obtaining images leading to reduced opacification within the pulmonary arterial branches however there is no convincing evidence of pulmonary artery thromboembolism. CT findings suggestive of COVID-19 pneumonia. Mildly enlarged mediastinal and hilar lymph nodes are favored to be reactive.  These findings were discussed with Dr. Lange by telephone at the time of dictation.  Radiation dose reduction techniques were utilized, including automated exposure control and exposure modulation based on body size.       XR Chest AP    Result Date: 11/8/2021  PORTABLE CHEST 11/08/2021  AM  CLINICAL HISTORY: COVID evaluation. Cough. Fever.  The lungs are fairly well-expanded and appear free of infiltrates. There is minimal atelectasis at the right lung base. There are no pleural effusions. The heart is at the upper limits of normal in size.  IMPRESSIONS: No evidence of acute disease within the chest.  This report was finalized on 11/8/2021 9:39 AM by Dr. Yonny Wray M.D.        I ordered the above noted radiological studies. Reviewed by me and discussed with radiologist.  See dictation for official radiology interpretation.      PROCEDURES  Procedures      MEDICATIONS GIVEN IN ER    Medications   enoxaparin (LOVENOX) syringe 40 mg (has no administration in time range)   remdesivir 200 mg in sodium chloride 0.9 % 290 mL IVPB (powder vial) (has no administration in time range)     Followed by   remdesivir 100 mg in sodium chloride 0.9 % 270 mL IVPB (powder vial) (has no administration in time range)   dexamethasone (DECADRON) tablet 6 mg (has no administration in time range)    acetaminophen (TYLENOL) tablet 1,000 mg (1,000 mg Oral Given 11/8/21 0914)   dexamethasone (DECADRON) injection 6 mg (6 mg Intravenous Given 11/8/21 0916)   lactated ringers bolus 500 mL (0 mL Intravenous Stopped 11/8/21 1036)   iopamidol (ISOVUE-370) 76 % injection 100 mL (100 mL Intravenous Given 11/8/21 1057)         PROGRESS, DATA ANALYSIS, CONSULTS, AND MEDICAL DECISION MAKING    All labs have been independently reviewed by me.  All radiology studies have been reviewed by me and discussed with radiologist dictating the report.   EKG's independently viewed and interpreted by me.  I have reviewed the nurse's notes, vital signs, past medical history, and medication list.  Discussion below represents my analysis of pertinent findings related to patient's condition, differential diagnosis, treatment plan and final disposition.    Differential diagnosis includes but is not limited to CHF, acute coronary syndrome, pulmonary embolism, pneumothorax, pneumonia, asthma/COPD, deconditioning, anemia, anxiety.           ED Course as of 11/08/21 1346   Mon Nov 08, 2021   0849 O2 sat was 91% on 5 L. [WH]   0916 EKG          EKG time: 9:02 AM  Rhythm/Rate: Sinus tachycardia, rate 101  P waves and VT: Normal  QRS, axis: Normal  ST and T waves: Nonspecific ST/T wave changes in the inferior leads, no ST elevation or depression    Interpreted Contemporaneously by me at 9:11 AM, independently viewed  No prior available for comparison    [WH]   0948 Chest x-ray interpreted by the radiologist and independently viewed by me.  There is minimal atelectasis at the right lung base.  No effusion.  No focal infiltrates. [WH]   1022 Lactate(!!): 2.5 [WH]   1022 Procalcitonin: 0.04 [WH]   1117 Results of the CTA chest discussed with Dr. Ramirez.  Images independently viewed by me.  There are bilateral patchy infiltrates consistent with Covid pneumonia.  No PE. [WH]   1122 Test results and plan for admission were discussed with the patient.   O2 sats are currently 95% while at high flow cannula.  Patient is breathing more comfortably. [WH]   1141 Case discussed with Dr. Ross and he agrees to admit the patient to a monitored bed.  Pertinent exam findings, test results, ED course, and diagnoses were discussed with him. [WH]   1202 Patient presented to ED with known Covid infection.  Sats were in the mid 80s on room air.  CTA of the chest showed bilateral infiltrates but was negative for pulmonary embolus.  Sats improved to the mid 90s on the high flow cannula.  He was given IV Decadron.  Case was discussed with hospitalist and he will be admitted. [WH]      ED Course User Index  [WH] Andrade Lange MD       AS OF 13:46 EST VITALS:    BP - 116/67  HR - 80  TEMP - 99.8 °F (37.7 °C)  O2 SATS - 94%      DIAGNOSIS  Final diagnoses:   Pneumonia due to COVID-19 virus   Acute respiratory failure with hypoxia (HCC)         DISPOSITION  Admission    ADMISSION    Discussed treatment plan and reason for admission with pt/family and admitting physician.  Pt/family voiced understanding of the plan for admission for further testing/treatment as needed.         Dictated utilizing Dragon dictation:  Much of this encounter note is an electronic transcription/translation of spoken language to printed text. The electronic translation of spoken language may permit erroneous, or at times, nonsensical words or phrases to be inadvertently transcribed; Although I have reviewed the note for such errors, some may still exist.     Andrade Lange MD  11/08/21 3822

## 2021-11-08 NOTE — PROGRESS NOTES
[facility]  Clinical Pharmacy Department     Remdesivir Review Note    Flo Willoughby is a 41 y.o. male with confirmed COVID-19 infection on day 1 of hospitalization.     Consulting Provider:  Shelby Knight  Date of Confirmed SARS-CoV-2: 10/30/21  Date of Symptom Onset: 10/30/21  Planned Duration of Therapy: 5 days  Other Antimicrobials: none  Hydroxychloroquine or chloroquine prior to arrival: none    Allergies  Allergies as of 11/08/2021 - Reviewed 11/08/2021   Allergen Reaction Noted    Azithromycin Shortness Of Breath 09/24/2019     Microbiology:  Microbiology Results (last 10 days)       Procedure Component Value - Date/Time    Respiratory Panel PCR w/COVID-19(SARS-CoV-2) [facility] In-House, NP Swab in UTM/VTM, 3-4 HR TAT - Swab, Nasopharynx [[id number]]  (Abnormal) Collected: 11/08/21 0914    Lab Status: Final result Specimen: Swab from Nasopharynx Updated: 11/08/21 1024     ADENOVIRUS, PCR Not Detected     Coronavirus 229E Not Detected     Coronavirus HKU1 Not Detected     Coronavirus NL63 Not Detected     Coronavirus OC43 Not Detected     COVID19 Detected     Human Metapneumovirus Not Detected     Human Rhinovirus/Enterovirus Not Detected     Influenza A PCR Not Detected     Influenza B PCR Not Detected     Parainfluenza Virus 1 Not Detected     Parainfluenza Virus 2 Not Detected     Parainfluenza Virus 3 Not Detected     Parainfluenza Virus 4 Not Detected     RSV, PCR Not Detected     Bordetella pertussis pcr Not Detected     Bordetella parapertussis PCR Not Detected     Chlamydophila pneumoniae PCR Not Detected     Mycoplasma pneumo by PCR Not Detected    Narrative:      In the setting of a positive respiratory panel with a viral infection PLUS a negative procalcitonin without other underlying concern for bacterial infection, consider observing off antibiotics or discontinuation of antibiotics and continue supportive care. If the respiratory panel is positive for atypical  bacterial infection (Bordetella pertussis, Chlamydophila pneumoniae, or Mycoplasma pneumoniae), consider antibiotic de-escalation to target atypical bacterial infection.            Radiology/Imaging:  Chest X-ray (11/8): no evidence of acute disease within the chest  CT: consistent with COVID PNA    Vitals/Labs/I&O  [unfilled]    Results from last 7 days   Lab Units 11/08/21  0910   WBC 10*3/mm3 15.99*     Results from last 7 days   Lab Units 11/08/21  0910   PROCALCITONIN ng/mL 0.04     Results from last 7 days   Lab Units 11/08/21  0910   AST (SGOT) U/L 45*      Results from last 7 days   Lab Units 11/08/21  0910   ALT (SGPT) U/L 35       Estimated Creatinine Clearance: 162.3 mL/min (A) (by C-G formula based on SCr of 0.67 mg/dL (L)).  Results from last 7 days   Lab Units 11/08/21  0910   BUN mg/dL 12   CREATININE mg/dL 0.67*     Intake & Output (last 3 days)         11/05 0701 11/06 0700 11/06 0701 11/07 0700 11/07 0701 11/08 0700 11/08 0701 11/09 0700    IV Piggyback    500    Total Intake(mL/kg)    500 (4.8)    Net    +500                  Assessment/Plan:  Patient is hospitalized with confirmed, severe COVID-19 infection and started on remdesivir 200 mg IV once followed by 100 mg IV daily for 4 days (5 day total duration). All inclusions, exclusions, and monitoring requirements listed below have been reviewed.    Patient is hospitalized with confirmed COVID-19 infection  Patient is requiring supplemental oxygen to maintain oxygen saturations of ? 94%   Baseline and daily LFTs and Scr have been ordered prior to remdesivir initiation  ALT is not ? 10 times the upper limit of normal  Patient is not on concomitant hydroxychloroquine or chloroquine  Patient does not require invasive mechanical ventilation (IMV) or ECMO  Patient is within 10 days from symptom onset       Thank you for involving pharmacy in this patient's care. Will discontinue formal pharmacy to dose consult at this time as initial dosing is  complete.   Please contact pharmacy with any questions or concerns.                           José Pantoja, PharmD  Pharmacy Resident  11/08/21 13:25 EST

## 2021-11-08 NOTE — ED TRIAGE NOTES
C/O Fever, Cough and increasing SOA. Was DX with COVID 9 days ago    Patient in mask. This RN in appropriate PPE throughout the patient's entire encounter.

## 2021-11-08 NOTE — ED NOTES
Pt was 84% on RA when placed in room. Pt placed on 4L NC. Yanelis, RN notified.      Adriana Cardoza RN  11/08/21 0845

## 2021-11-08 NOTE — PROGRESS NOTES
[facility]  Clinical Pharmacy Department     Remdesivir Review Note    Flo Willoughby is a 41 y.o. male with confirmed COVID-19 infection on day 1 of hospitalization.     Consulting Provider:  Shelby Knight  Date of Confirmed SARS-CoV-2: 10/30/21  Date of Symptom Onset: 10/30/21  Planned Duration of Therapy: 5 days  Other Antimicrobials: none  Hydroxychloroquine or chloroquine prior to arrival: none    Allergies  Allergies as of 11/08/2021 - Reviewed 11/08/2021   Allergen Reaction Noted    Azithromycin Shortness Of Breath 09/24/2019     Microbiology:  Microbiology Results (last 10 days)       Procedure Component Value - Date/Time    Respiratory Panel PCR w/COVID-19(SARS-CoV-2) [facility] In-House, NP Swab in UTM/VTM, 3-4 HR TAT - Swab, Nasopharynx [[id number]]  (Abnormal) Collected: 11/08/21 0914    Lab Status: Final result Specimen: Swab from Nasopharynx Updated: 11/08/21 1024     ADENOVIRUS, PCR Not Detected     Coronavirus 229E Not Detected     Coronavirus HKU1 Not Detected     Coronavirus NL63 Not Detected     Coronavirus OC43 Not Detected     COVID19 Detected     Human Metapneumovirus Not Detected     Human Rhinovirus/Enterovirus Not Detected     Influenza A PCR Not Detected     Influenza B PCR Not Detected     Parainfluenza Virus 1 Not Detected     Parainfluenza Virus 2 Not Detected     Parainfluenza Virus 3 Not Detected     Parainfluenza Virus 4 Not Detected     RSV, PCR Not Detected     Bordetella pertussis pcr Not Detected     Bordetella parapertussis PCR Not Detected     Chlamydophila pneumoniae PCR Not Detected     Mycoplasma pneumo by PCR Not Detected    Narrative:      In the setting of a positive respiratory panel with a viral infection PLUS a negative procalcitonin without other underlying concern for bacterial infection, consider observing off antibiotics or discontinuation of antibiotics and continue supportive care. If the respiratory panel is positive for atypical  bacterial infection (Bordetella pertussis, Chlamydophila pneumoniae, or Mycoplasma pneumoniae), consider antibiotic de-escalation to target atypical bacterial infection.            Radiology/Imaging:  Chest X-ray (11/8): no evidence of acute disease within the chest  CT: consistent with COVID PNA    Vitals/Labs/I&O  [unfilled]    Results from last 7 days   Lab Units 11/08/21  0910   WBC 10*3/mm3 15.99*     Results from last 7 days   Lab Units 11/08/21  0910   PROCALCITONIN ng/mL 0.04     Results from last 7 days   Lab Units 11/08/21  0910   AST (SGOT) U/L 45*      Results from last 7 days   Lab Units 11/08/21  0910   ALT (SGPT) U/L 35       Estimated Creatinine Clearance: 162.3 mL/min (A) (by C-G formula based on SCr of 0.67 mg/dL (L)).  Results from last 7 days   Lab Units 11/08/21  0910   BUN mg/dL 12   CREATININE mg/dL 0.67*     Intake & Output (last 3 days)         11/05 0701 11/06 0700 11/06 0701 11/07 0700 11/07 0701 11/08 0700 11/08 0701 11/09 0700    IV Piggyback    500    Total Intake(mL/kg)    500 (4.8)    Net    +500                  Assessment/Plan:  Patient is hospitalized with confirmed, severe COVID-19 infection and started on remdesivir 200 mg IV once followed by 100 mg IV daily for 4 days (5 day total duration). All inclusions, exclusions, and monitoring requirements listed below have been reviewed.    Patient is hospitalized with confirmed COVID-19 infection  Patient is requiring supplemental oxygen to maintain oxygen saturations of ? 94%   Baseline and daily LFTs and Scr have been ordered prior to remdesivir initiation  ALT is not ? 10 times the upper limit of normal  Patient is not on concomitant hydroxychloroquine or chloroquine  Patient does not require invasive mechanical ventilation (IMV) or ECMO  Patient is within 10 days from symptom onset       Thank you for involving pharmacy in this patient's care. Will discontinue formal pharmacy to dose consult at this time as initial dosing is  complete.   Please contact pharmacy with any questions or concerns.                           José Pantoja, PharmD  Pharmacy Resident  11/08/21 13:25 EST

## 2021-11-09 PROBLEM — R73.03 PREDIABETES: Status: ACTIVE | Noted: 2021-11-09

## 2021-11-09 PROBLEM — D89.839: Status: ACTIVE | Noted: 2021-11-09

## 2021-11-09 LAB
ALBUMIN SERPL-MCNC: 3.7 G/DL (ref 3.5–5.2)
ALBUMIN/GLOB SERPL: 1.2 G/DL
ALP SERPL-CCNC: 75 U/L (ref 39–117)
ALT SERPL W P-5'-P-CCNC: 31 U/L (ref 1–41)
ANION GAP SERPL CALCULATED.3IONS-SCNC: 11.9 MMOL/L (ref 5–15)
AST SERPL-CCNC: 32 U/L (ref 1–40)
BASOPHILS # BLD AUTO: 0.02 10*3/MM3 (ref 0–0.2)
BASOPHILS NFR BLD AUTO: 0.2 % (ref 0–1.5)
BILIRUB SERPL-MCNC: 0.5 MG/DL (ref 0–1.2)
BUN SERPL-MCNC: 14 MG/DL (ref 6–20)
BUN/CREAT SERPL: 23.7 (ref 7–25)
CALCIUM SPEC-SCNC: 8.7 MG/DL (ref 8.6–10.5)
CHLORIDE SERPL-SCNC: 103 MMOL/L (ref 98–107)
CO2 SERPL-SCNC: 24.1 MMOL/L (ref 22–29)
CREAT SERPL-MCNC: 0.59 MG/DL (ref 0.76–1.27)
CRP SERPL-MCNC: 15.94 MG/DL (ref 0–0.5)
D DIMER PPP FEU-MCNC: 0.34 MCGFEU/ML (ref 0–0.49)
DEPRECATED RDW RBC AUTO: 42.5 FL (ref 37–54)
EOSINOPHIL # BLD AUTO: 0 10*3/MM3 (ref 0–0.4)
EOSINOPHIL NFR BLD AUTO: 0 % (ref 0.3–6.2)
ERYTHROCYTE [DISTWIDTH] IN BLOOD BY AUTOMATED COUNT: 12.9 % (ref 12.3–15.4)
FERRITIN SERPL-MCNC: 1379 NG/ML (ref 30–400)
GFR SERPL CREATININE-BSD FRML MDRD: >150 ML/MIN/1.73
GLOBULIN UR ELPH-MCNC: 3 GM/DL
GLUCOSE BLDC GLUCOMTR-MCNC: 165 MG/DL (ref 70–130)
GLUCOSE BLDC GLUCOMTR-MCNC: 196 MG/DL (ref 70–130)
GLUCOSE SERPL-MCNC: 192 MG/DL (ref 65–99)
HBA1C MFR BLD: 5.9 % (ref 4.8–5.6)
HCT VFR BLD AUTO: 43.9 % (ref 37.5–51)
HGB BLD-MCNC: 15 G/DL (ref 13–17.7)
IMM GRANULOCYTES # BLD AUTO: 0.2 10*3/MM3 (ref 0–0.05)
IMM GRANULOCYTES NFR BLD AUTO: 1.9 % (ref 0–0.5)
LDH SERPL-CCNC: 533 U/L (ref 135–225)
LYMPHOCYTES # BLD AUTO: 1.03 10*3/MM3 (ref 0.7–3.1)
LYMPHOCYTES NFR BLD AUTO: 9.6 % (ref 19.6–45.3)
MCH RBC QN AUTO: 30.5 PG (ref 26.6–33)
MCHC RBC AUTO-ENTMCNC: 34.2 G/DL (ref 31.5–35.7)
MCV RBC AUTO: 89.4 FL (ref 79–97)
MONOCYTES # BLD AUTO: 0.3 10*3/MM3 (ref 0.1–0.9)
MONOCYTES NFR BLD AUTO: 2.8 % (ref 5–12)
NEUTROPHILS NFR BLD AUTO: 85.5 % (ref 42.7–76)
NEUTROPHILS NFR BLD AUTO: 9.2 10*3/MM3 (ref 1.7–7)
NRBC BLD AUTO-RTO: 0.1 /100 WBC (ref 0–0.2)
PLATELET # BLD AUTO: 324 10*3/MM3 (ref 140–450)
PMV BLD AUTO: 9.4 FL (ref 6–12)
POTASSIUM SERPL-SCNC: 4.2 MMOL/L (ref 3.5–5.2)
PROCALCITONIN SERPL-MCNC: 0.07 NG/ML (ref 0–0.25)
PROT SERPL-MCNC: 6.7 G/DL (ref 6–8.5)
QT INTERVAL: 316 MS
RBC # BLD AUTO: 4.91 10*6/MM3 (ref 4.14–5.8)
SODIUM SERPL-SCNC: 139 MMOL/L (ref 136–145)
WBC # BLD AUTO: 10.75 10*3/MM3 (ref 3.4–10.8)

## 2021-11-09 PROCEDURE — 94761 N-INVAS EAR/PLS OXIMETRY MLT: CPT

## 2021-11-09 PROCEDURE — 94799 UNLISTED PULMONARY SVC/PX: CPT

## 2021-11-09 PROCEDURE — 63710000001 DEXAMETHASONE PER 0.25 MG: Performed by: NURSE PRACTITIONER

## 2021-11-09 PROCEDURE — 85379 FIBRIN DEGRADATION QUANT: CPT | Performed by: NURSE PRACTITIONER

## 2021-11-09 PROCEDURE — 86140 C-REACTIVE PROTEIN: CPT | Performed by: NURSE PRACTITIONER

## 2021-11-09 PROCEDURE — 99223 1ST HOSP IP/OBS HIGH 75: CPT | Performed by: INTERNAL MEDICINE

## 2021-11-09 PROCEDURE — 36415 COLL VENOUS BLD VENIPUNCTURE: CPT | Performed by: NURSE PRACTITIONER

## 2021-11-09 PROCEDURE — 63710000001 INSULIN LISPRO (HUMAN) PER 5 UNITS: Performed by: NURSE PRACTITIONER

## 2021-11-09 PROCEDURE — 80053 COMPREHEN METABOLIC PANEL: CPT | Performed by: NURSE PRACTITIONER

## 2021-11-09 PROCEDURE — 83615 LACTATE (LD) (LDH) ENZYME: CPT | Performed by: NURSE PRACTITIONER

## 2021-11-09 PROCEDURE — 84145 PROCALCITONIN (PCT): CPT | Performed by: NURSE PRACTITIONER

## 2021-11-09 PROCEDURE — 82962 GLUCOSE BLOOD TEST: CPT

## 2021-11-09 PROCEDURE — 94640 AIRWAY INHALATION TREATMENT: CPT

## 2021-11-09 PROCEDURE — 94760 N-INVAS EAR/PLS OXIMETRY 1: CPT

## 2021-11-09 PROCEDURE — 83036 HEMOGLOBIN GLYCOSYLATED A1C: CPT | Performed by: INTERNAL MEDICINE

## 2021-11-09 PROCEDURE — 25010000002 ENOXAPARIN PER 10 MG: Performed by: NURSE PRACTITIONER

## 2021-11-09 PROCEDURE — 85025 COMPLETE CBC W/AUTO DIFF WBC: CPT | Performed by: NURSE PRACTITIONER

## 2021-11-09 PROCEDURE — 94664 DEMO&/EVAL PT USE INHALER: CPT

## 2021-11-09 PROCEDURE — 82728 ASSAY OF FERRITIN: CPT | Performed by: NURSE PRACTITIONER

## 2021-11-09 PROCEDURE — XW0DXM6 INTRODUCTION OF BARICITINIB INTO MOUTH AND PHARYNX, EXTERNAL APPROACH, NEW TECHNOLOGY GROUP 6: ICD-10-PCS | Performed by: INTERNAL MEDICINE

## 2021-11-09 RX ORDER — DEXTROSE MONOHYDRATE 25 G/50ML
25 INJECTION, SOLUTION INTRAVENOUS
Status: DISCONTINUED | OUTPATIENT
Start: 2021-11-09 | End: 2021-11-16 | Stop reason: HOSPADM

## 2021-11-09 RX ORDER — NICOTINE POLACRILEX 4 MG
15 LOZENGE BUCCAL
Status: DISCONTINUED | OUTPATIENT
Start: 2021-11-09 | End: 2021-11-16 | Stop reason: HOSPADM

## 2021-11-09 RX ORDER — INSULIN LISPRO 100 [IU]/ML
0-9 INJECTION, SOLUTION INTRAVENOUS; SUBCUTANEOUS
Status: DISCONTINUED | OUTPATIENT
Start: 2021-11-09 | End: 2021-11-16 | Stop reason: HOSPADM

## 2021-11-09 RX ADMIN — INSULIN LISPRO 2 UNITS: 100 INJECTION, SOLUTION INTRAVENOUS; SUBCUTANEOUS at 21:03

## 2021-11-09 RX ADMIN — DEXAMETHASONE 6 MG: 4 TABLET ORAL at 09:37

## 2021-11-09 RX ADMIN — ATORVASTATIN CALCIUM 20 MG: 20 TABLET, FILM COATED ORAL at 21:02

## 2021-11-09 RX ADMIN — REMDESIVIR 100 MG: 100 INJECTION, POWDER, LYOPHILIZED, FOR SOLUTION INTRAVENOUS at 14:39

## 2021-11-09 RX ADMIN — CYCLOBENZAPRINE 5 MG: 10 TABLET, FILM COATED ORAL at 21:12

## 2021-11-09 RX ADMIN — GABAPENTIN 100 MG: 100 CAPSULE ORAL at 09:37

## 2021-11-09 RX ADMIN — BUDESONIDE AND FORMOTEROL FUMARATE DIHYDRATE 2 PUFF: 160; 4.5 AEROSOL RESPIRATORY (INHALATION) at 12:34

## 2021-11-09 RX ADMIN — BARICITINIB 4 MG: 2 TABLET, FILM COATED ORAL at 17:40

## 2021-11-09 RX ADMIN — GABAPENTIN 100 MG: 100 CAPSULE ORAL at 21:03

## 2021-11-09 RX ADMIN — GABAPENTIN 100 MG: 100 CAPSULE ORAL at 17:40

## 2021-11-09 RX ADMIN — PANTOPRAZOLE SODIUM 40 MG: 40 INJECTION, POWDER, FOR SOLUTION INTRAVENOUS at 06:45

## 2021-11-09 RX ADMIN — ENOXAPARIN SODIUM 40 MG: 40 INJECTION SUBCUTANEOUS at 14:39

## 2021-11-09 RX ADMIN — BUDESONIDE AND FORMOTEROL FUMARATE DIHYDRATE 2 PUFF: 160; 4.5 AEROSOL RESPIRATORY (INHALATION) at 20:40

## 2021-11-09 RX ADMIN — METOPROLOL TARTRATE 50 MG: 50 TABLET, FILM COATED ORAL at 09:37

## 2021-11-09 RX ADMIN — DEXAMETHASONE 6 MG: 4 TABLET ORAL at 21:02

## 2021-11-09 RX ADMIN — METOPROLOL TARTRATE 50 MG: 50 TABLET, FILM COATED ORAL at 21:02

## 2021-11-09 RX ADMIN — INSULIN LISPRO 2 UNITS: 100 INJECTION, SOLUTION INTRAVENOUS; SUBCUTANEOUS at 17:40

## 2021-11-09 NOTE — PROGRESS NOTES
[facility]  Clinical Pharmacy Department     Pharmacy Consult: Baricitinib Dosing  Flo Willoughby is a 41 y.o. male currently hospitalized for COVID-19 pneumonia. Pharmacy has been consulted by Dr. Irwin to dose baricitinib for COVID-19.     Relevant Concomitant Medications (antibiotics/antifungals/antivirals/steroids):  -Dexamethasone 6mg PO q12h (Day #2)  -Remdesivir 100mg IV q24h (Day #2)     Patient meets the following inclusion criteria:  Hospitalized with confirmed COVID-19 infection. Date of positive SARS-CoV-2 test: 11/8/21  Receiving high-flow NC or NIVM with rapidly progressive illness and evidence of systemic inflammation.  11L humidified; high-flow nasal cannula     Patient does not meet the following exclusion criteria:  Requiring invasive mechanical ventilation of ECMO  High concern for or presence of bacterial or fungal coinfection  Patient is receiving baricitinib or other immunomodulators (other than corticosteroids)  Patient/family is currently proceeding with palliative care.  EGFR < 15 mL/min/1.73 m2: use is not recommended    Baseline Lab values:  eGFR = >150 mL/min/1.73m2    Results from last 7 days   Lab Units 11/09/21  0612 11/08/21  1431 11/08/21  0910   CREATININE mg/dL 0.59* 0.81 0.67*     Results from last 7 days   Lab Units 11/09/21  0612 11/08/21  1431 11/08/21  0910   ALK PHOS U/L 75 64 74   BILIRUBIN mg/dL 0.5 0.4 0.4   BILIRUBIN DIRECT mg/dL  --  0.2  --    ALT (SGPT) U/L 31 33 35   AST (SGOT) U/L 32 39 45*     Results from last 7 days   Lab Units 11/09/21  0612 11/08/21  0910   WBC 10*3/mm3 10.75 15.99*   HEMOGLOBIN g/dL 15.0 15.6   HEMATOCRIT % 43.9 46.7   PLATELETS 10*3/mm3 324 329   NEUTROS ABS 10*3/mm3 9.20* 13.31*      Plan:  Proceed with baricitinib 4 mg daily x 14 days or until hospital discharge    Recommended monitoring (daily CMP x5 days on order and daily CBC x5 days on order, added SCr and Hepatic Function Panel for remainder of duration.  Defer to ID as to frequency  of repeat CBC):  eGFR  LFTs  CBC w/ diff (ANC, ALC)     Thank you for this consult. Pharmacy will continue to follow for monitoring and dose adjustments. Please contact pharmacy with any questions or concerns.     Ge Schumacher, PharmD  Clinical Pharmacist

## 2021-11-09 NOTE — CONSULTS
[facility]    Reason for consult; covid19 pneumonia, ahrf, emphysema    HPI:  Mr. Fernando is a 42yo wm with a history of tobacco abuse and working with lead dust.  He has no known pulmonary history. He was admitted here yesterday with  Sudden one tof body aches, loss of taste and smeel and then he tested positive for covid 9 days ago.  He since then has had progressive shortness of breath that became severe and was better with oxygen, worse with exertion. He has been admitted here and is on high flow oxygen with high CRP.  He has extensive infiltrates on CT chest as well as underlying emphysematous changes.      Past Medical History:   Diagnosis Date   • Hyperlipidemia    • Hypertension      Social History     Socioeconomic History   • Marital status: Single   Tobacco Use   • Smoking status: Never Smoker   • Smokeless tobacco: Never Used   Vaping Use   • Vaping Use: Never used   he used to smoke above reported never smoker is incorrect  Works around lead dust as well    History reviewed. No pertinent family history.  MEDS: reviewed as per chart  ALL: azithromycin  ROS: 12 point negative except as in HPI    Vital Sign Min/Max for last 24 hours  Temp  Min: 96.1 °F (35.6 °C)  Max: 101.2 °F (38.4 °C)   BP  Min: 109/64  Max: 150/98   Pulse  Min: 70  Max: 101   Resp  Min: 16  Max: 20   SpO2  Min: 90 %  Max: 96 %   Flow (L/min)  Min: 10  Max: 15   No data recorded     GEN:  NAD, appears ill, obese, AxOx3  HEENT: PERRL, EOMI, no icterus, mmm, no jvd, trachea midline, neck supple  CHEST: CTA bilat, no wheezes, no crackles, no use of accessory muscles  CV: RRR, no m/g/r  ABD: soft, nt, nd +bs, no hepatosplenomegaly  EXT: no c/c/e  SKIN: no rashes, no xanthomas, nl turgor, warm, dry  LYMPH: no palpable cervical or supraclavicular lymphadenopathy  NEURO: CN 2-12 intact and symmetric bilaterally  PSYCH: nl affect, nl orientation, nl judgement, nl mood  MKS: no kyphoscoliosis, 5/5 strength ue and le  bilaterally      A/P:  1. COVID 19 pneumonia -- severe case but wonder if some of his hypoxemia is due to underlying emphysema and/or chronic lung disease from lead dust exposure.  Will add bronchodilators.   2. Emphysema -- more extensive than I would expect for a 42yo, will order alpha one.  3. AhRF --oxygen as needed  4. Obesity      I think tociluzimab or bartinib might be beneficial here and I discussed it with him and he is ok with it, I would like to get ID opinion as well.

## 2021-11-09 NOTE — PROGRESS NOTES
Name: Flo Willoughby ADMIT: 2021   : 1980  PCP: Provider, No Known    MRN: [id number] LOS: 1 days   AGE/SEX: 41 y.o. male  ROOM: [facility]     Subjective   Subjective     Although his oxygen requirements have increased he feels better today than yesterday.  He denies nausea or vomiting.  Tolerating clear liquid diet without issue and wants to eat the candy that his daughter has brought to him.  He is coughing but no significant shortness of breath.  Denies chest pain except with deep breathing and coughing similar to yesterday.  He denies nausea, vomiting, abdominal pain, diarrhea, rash, confusion, headache    Review of Systems see above.      Objective   Objective   Vital Signs  Temp:  [96.1 °F (35.6 °C)-101.2 °F (38.4 °C)] 98.6 °F (37 °C)  Heart Rate:  [] 71  Resp:  [16-20] 16  BP: (109-150)/(64-98) 116/72  SpO2:  [90 %-96 %] 92 %  on  Flow (L/min):  [10-15] 13;   Device (Oxygen Therapy): humidified; high-flow nasal cannula  Body mass index is 37.12 kg/m².  Physical Exam  Vitals reviewed.   Constitutional:       Appearance: He is well-developed. He is obese. He is ill-appearing.   HENT:      Head: Normocephalic and atraumatic.   Cardiovascular:      Rate and Rhythm: Normal rate and regular rhythm.   Pulmonary:      Effort: No respiratory distress.      Breath sounds: Rhonchi present. No wheezing.      Comments: No increased work of breathing. Decreased breath sounds, scattered rhonchi  Abdominal:      General: Bowel sounds are normal. There is no distension.      Palpations: Abdomen is soft. There is no mass.      Tenderness: There is no abdominal tenderness.      Hernia: No hernia is present.   Musculoskeletal:      Right lower leg: No edema.      Left lower leg: No edema.   Skin:     General: Skin is warm and dry.   Neurological:      General: No focal deficit present.      Mental Status: He is alert and oriented to person, place, and time.   Psychiatric:         Behavior: Behavior normal.          Thought Content: Thought content normal.         Judgment: Judgment normal.         Results Review     I reviewed the patient's new clinical results.  Results from last 7 days   Lab Units 11/09/21  0612 11/08/21  0910   WBC 10*3/mm3 10.75 15.99*   HEMOGLOBIN g/dL 15.0 15.6   PLATELETS 10*3/mm3 324 329     Results from last 7 days   Lab Units 11/09/21  0612 11/08/21  1431 11/08/21  0910   SODIUM mmol/L 139  --  143   POTASSIUM mmol/L 4.2  --  3.6   CHLORIDE mmol/L 103  --  104   CO2 mmol/L 24.1  --  23.5   BUN mg/dL 14  --  12   CREATININE mg/dL 0.59* 0.81 0.67*   GLUCOSE mg/dL 192*  --  140*   EGFR IF NONAFRICN AM mL/min/1.73 >150 105 130     Results from last 7 days   Lab Units 11/09/21  0612 11/08/21  1431 11/08/21  0910   ALBUMIN g/dL 3.70 3.60 4.00   BILIRUBIN mg/dL 0.5 0.4 0.4   ALK PHOS U/L 75 64 74   AST (SGOT) U/L 32 39 45*   ALT (SGPT) U/L 31 33 35     Results from last 7 days   Lab Units 11/09/21  0612 11/08/21  1431 11/08/21  0910   CALCIUM mg/dL 8.7  --  8.7   ALBUMIN g/dL 3.70 3.60 4.00     Results from last 7 days   Lab Units 11/09/21  0612 11/08/21  1431 11/08/21  0910   PROCALCITONIN ng/mL 0.07 0.07 0.04   LACTATE mmol/L  --  1.7 2.5*     COVID19   Date Value Ref Range Status   11/08/2021 Detected (C) Not Detected - Ref. Range Final     Hemoglobin A1C   Date/Time Value Ref Range Status   11/09/2021 0612 5.90 (H) 4.80 - 5.60 % Final     COVID LABS:  Results From Last 14 Days   Lab Units 11/09/21  0612 11/08/21 1922 11/08/21 1431 11/08/21  0910   PROBNP pg/mL  --   --   --  134.0   CRP mg/dL 15.94*  --   --  7.91*   D DIMER QUANT MCGFEU/mL 0.34  --  0.36  --    FERRITIN ng/mL 1,379.00*  --   --  1,577.00*   LACTATE mmol/L  --   --  1.7 2.5*   LDH U/L 533*  --   --  563*   PROCALCITONIN ng/mL 0.07  --  0.07 0.04   TROPONIN T ng/mL  --  <0.010  --  <0.010         CT Angiogram Chest  Narrative: CT ANGIOGRAM CHEST WITH CONTRAST, PULMONARY EMBOLISM PROTOCOL     HISTORY: 41-year-old male with history of   COVID, evaluate for pulmonary  embolism. Hypoxia.     TECHNIQUE: Spiral CT images were obtained from the lung apices to the  diaphragmatic domes following administration of intravenous contrast in  the angiographic phase. Coronal, sagittal and 3-D volume rendered  reformats were then obtained.     COMPARISON: None.      FINDINGS: Slight delay in obtaining images with decreased opacification  within the pulmonary arteries. This limits evaluation of segmental  arteries. There are no convincing evidence of pulmonary artery  thromboembolism. Thoracic aorta is normal without evidence of  dissection. No pleural or pericardial effusion is seen. Mildly enlarged  mediastinal and hilar lymphadenopathy is seen. Index subcarinal lymph  node measures up to 1.1 cm in short axis dimension. A right infrahilar  lymph node measures up to 9 mm in short axis dimension. A left hilar  lymph node measures up to 1.1 cm in short axis dimension. The central  airways are patent without endobronchial lesion. There are areas of both  peripheral and central ground glass as well as consolidative opacity in  a distribution consistent with COVID-19 pneumonia. Background  emphysematous changes are present.      Impression: Slight delay in obtaining images leading to reduced  opacification within the pulmonary arterial branches however there is no  convincing evidence of pulmonary artery thromboembolism. CT findings  suggestive of COVID-19 pneumonia. Mildly enlarged mediastinal and hilar  lymph nodes are favored to be reactive.     These findings were discussed with Dr. Lange by telephone at the time  of dictation.     Radiation dose reduction techniques were utilized, including automated  exposure control and exposure modulation based on body size.     This report was finalized on 11/9/2021 1:11 PM by Dr. Ramy Olivas M.D.       Scheduled Medications  atorvastatin, 20 mg, Oral, Nightly  baricitinib, 4 mg, Oral, Daily  budesonide-formoterol, 2  puff, Inhalation, BID - RT  dexamethasone, 6 mg, Oral, Q12H  enoxaparin, 40 mg, Subcutaneous, Q24H  gabapentin, 100 mg, Oral, TID  insulin lispro, 0-9 Units, Subcutaneous, 4x Daily With Meals & Nightly  metoprolol tartrate, 50 mg, Oral, Q12H  pantoprazole, 40 mg, Intravenous, Q AM  remdesivir, 100 mg, Intravenous, Q24H    Infusions  Pharmacy to Dose Baricitinib (COVID-19),     Diet  Diet Regular; Cardiac, Consistent Carbohydrate       Assessment/Plan     Active Hospital Problems    Diagnosis  POA   • **Pneumonia due to COVID-19 virus [U07.1, J12.82]  Yes   • Cytokine release syndrome, grade unspecified [D89.839]  Yes   • Prediabetes [R73.03]  Yes   • Acute respiratory failure with hypoxia (HCC) [J96.01]  Yes   • HTN (hypertension) [I10]  Yes   • Former smoker [Z87.891]  Not Applicable   • Class 2 obesity in adult [E66.9]  Yes      Resolved Hospital Problems   No resolved problems to display.   Current oxygen requirement:  SpO2:  [90 %-96 %] 92 % on Flow (L/min):  [10-15] 13;   Device (Oxygen Therapy): humidified; high-flow nasal cannula  Appreciate infectious disease assistance.  Pulmonary consulted.  Start baricitinib per ID.   Continue  Dexamethasone 6 mg po bid   Remdesivir day 2/5    Routine incentive spirometry.    History of HTN/CAD  Holding Norvasc, beta-blocker with holding parameters.    Hyperglycemia/ prediabetes   Prediabetes with A1c 5.9. glucose exacerbated by steroids.  Patient given candy by his daughter but advised to limit intake. Will start correctional insulin and change to consistent carb diet.     HLD- lipitor    Obesity- complicated all above.       Lovenox for DVT prophylaxis.  Full code.  Discussed with patient, nursing staff and Dr Irwin .     RULA Montgomery  [facility]  11/09/21  15:04 EST

## 2021-11-09 NOTE — CASE MANAGEMENT/SOCIAL WORK
Continued Stay Note  [facility]     Patient Name: Flo Willoughby  MRN: [id number]  Today's Date: 11/9/2021    Admit Date: 11/8/2021     Discharge Plan     Row Name 11/09/21 1523       Plan    Plan Comments Attempted to reach patient via room phone to complete screen. No answer. Will follow up tomorrow. Yanelis Cuello RN CCP               Discharge Codes    No documentation.                     Yanelis Cuello RN

## 2021-11-09 NOTE — CONSULTS
Referring Provider: MERY Charles for toci?    Subjective   History of present illness: This very nice 41-year-old we are asked for evaluation opinion regarding COVID-19.  Per the patient, he says usual state of health until 10/28/2021 when he developed myalgias and diarrhea and cough low-grade fever.  He says his cough is mixed between productive sputum and dry.  He went on to develop loss of taste and smell and tested positive for Covid on or about 10/30/2021.  He has not been vaccinated.  He thinks he was exposed to COVID-19 from his mother who is also positive.  In any event, most of his symptoms resolved but he developed progressive shortness of breath which prompted evaluation to [facility] emergency department on 11/8/2021.  Despite being started on Remdesivir and steroids he has had progressive respiratory failure and is now requiring high flow nasal cannula.  Despite steroids, his CRP has increased upon admission and is now 15  Past medical history: Hypertension, hyperlipidemia  Family history notable for COVID-19 his mother  Social history: He is employed working with electronic batteries for fork lifts.  Ex-smoker and drinker.  Lives here in [place], [place] with his mother        Review of Systems  Pertinent items are noted in HPI, all other systems reviewed and negative    Objective     Physical Exam:   Vital Signs   Temp:  [96.1 °F (35.6 °C)-101.2 °F (38.4 °C)] 97.9 °F (36.6 °C)  Heart Rate:  [] 77  Resp:  [18-20] 18  BP: (109-150)/(60-98) 118/80    GENERAL: Awake and alert, in no acute distress.   HEENT: Oropharynx is clear. Hearing is grossly normal.   EYES: PERRL. No conjunctival injection. No lid lag.   LYMPHATICS: No lymphadenopathy of the neck or inguinal regions.   HEART: Regular rate and regular rhythm. No peripheral edema.   LUNGS: Distant, mild rales with normal respiratory effort.   GI: Soft, nontender, nondistended. No appreciable organomegaly.   SKIN: Warm and dry  without cutaneous eruptions   PSYCHIATRIC: Appropriate mood, affect, insight, and judgment.     Results Review:  D-dimer 0.34  Procalcitonin 0.07  CRP 7.91 on admission now 15.94  White count 15.99 yesterday now down to 10.75  Creatinine 0.59  Glucose 192     Microbiology:  Respiratory pathogen panel: COVID-19 positive  Blood cultures 2/2 no growth to date    Radiology:  CT chest, independently interpreted shows with patchy predominantly peripheral infiltrates.  No PE as per radiology with mild lymphadenopathy  EKG with sinus tachycardia and normal QT interval  Assessment/Plan   1.  Acute hypoxic respiratory failure  2.  COVID-19 pneumonia  3.  Acute lung injury/hyper inflammatory state    Severe respiratory failure and hyper inflammatory state.  Agree with enhanced immunomodulatory therapy.  Start baricitinib. Very guarded prognosis.    I have discussed with the patient the following regarding baricitinib (declines fact sheet)    1. FDA has authorized emergency use of baricitinibwhich is not FDA approved for treatment of COVID19     2. The patient or caregiver has the option to accept or refuse administration of baricitinib    3. The significant known and potential risks and benefits of baricitiniband the extent to which such risks and benefits are unknown     4. Information on available alternative treatments and the risks and benefits of those alternatives.         Thank you for this consult.  We will continue to follow along and tailor antibiotics as the patient's clinical course evolves.      Krishna Irwin MD  11/09/21  12:00 EST

## 2021-11-09 NOTE — PLAN OF CARE
Problem: Adult Inpatient Plan of Care  Goal: Plan of Care Review  Flowsheets (Taken 11/9/2021 0430)  Progress: improving  Plan of Care Reviewed With: patient  Outcome Summary: Pt reporting chest pain @ start of shift. Medicated per MAR. STAT EKG showed ST. STAT trop negative. VSS @ this time. No c/o pain or discomfort @ this time. ST to NSR on tele. O2 in use @ 10L via HFNC. Up ad giles. Clear liquid diet. Makes needs known. Resting comfortably @ this time. Bed locked and in lowest position. Side rails up x2. Call light within reach. Will continue to assess.  Goal: Absence of Hospital-Acquired Illness or Injury  Intervention: Identify and Manage Fall Risk  Flowsheets  Taken 11/9/2021 0426  Safety Promotion/Fall Prevention:   activity supervised   assistive device/personal items within reach   clutter free environment maintained   fall prevention program maintained   lighting adjusted   nonskid shoes/slippers when out of bed   room organization consistent   safety round/check completed  Taken 11/9/2021 0259  Safety Promotion/Fall Prevention:   activity supervised   assistive device/personal items within reach   clutter free environment maintained   fall prevention program maintained   lighting adjusted   nonskid shoes/slippers when out of bed   room organization consistent   safety round/check completed  Taken 11/9/2021 0004  Safety Promotion/Fall Prevention:   activity supervised   assistive device/personal items within reach   clutter free environment maintained   fall prevention program maintained   lighting adjusted   nonskid shoes/slippers when out of bed   room organization consistent   safety round/check completed  Taken 11/8/2021 2211  Safety Promotion/Fall Prevention:   activity supervised   assistive device/personal items within reach   clutter free environment maintained   fall prevention program maintained   lighting adjusted   nonskid shoes/slippers when out of bed   room organization consistent   safety  round/check completed  Taken 11/8/2021 2001  Safety Promotion/Fall Prevention:   activity supervised   assistive device/personal items within reach   clutter free environment maintained   fall prevention program maintained   lighting adjusted   nonskid shoes/slippers when out of bed   room organization consistent   safety round/check completed  Intervention: Prevent Skin Injury  Flowsheets  Taken 11/9/2021 0426  Body Position: position changed independently  Taken 11/9/2021 0259  Body Position: position changed independently  Taken 11/9/2021 0004  Body Position: position changed independently  Skin Protection: adhesive use limited  Taken 11/8/2021 2211  Body Position: position changed independently  Taken 11/8/2021 2001  Body Position: position changed independently  Skin Protection: adhesive use limited  Intervention: Prevent and Manage VTE (venous thromboembolism) Risk  Flowsheets (Taken 11/9/2021 0430)  VTE Prevention/Management:   bilateral   dorsiflexion/plantar flexion performed  Intervention: Prevent Infection  Flowsheets  Taken 11/9/2021 0426  Infection Prevention:   environmental surveillance performed   equipment surfaces disinfected   hand hygiene promoted   personal protective equipment utilized   rest/sleep promoted   single patient room provided   visitors restricted/screened  Taken 11/9/2021 0259  Infection Prevention:   environmental surveillance performed   equipment surfaces disinfected   hand hygiene promoted   rest/sleep promoted   personal protective equipment utilized   single patient room provided   visitors restricted/screened  Taken 11/9/2021 0004  Infection Prevention:   environmental surveillance performed   equipment surfaces disinfected   hand hygiene promoted   rest/sleep promoted   personal protective equipment utilized   single patient room provided   visitors restricted/screened  Taken 11/8/2021 2211  Infection Prevention:   environmental surveillance performed   equipment surfaces  disinfected   hand hygiene promoted   personal protective equipment utilized   rest/sleep promoted   single patient room provided   visitors restricted/screened  Taken 11/8/2021 2001  Infection Prevention:   environmental surveillance performed   equipment surfaces disinfected   hand hygiene promoted   personal protective equipment utilized   rest/sleep promoted   single patient room provided   visitors restricted/screened  Goal: Optimal Comfort and Wellbeing  Intervention: Provide Person-Centered Care  Flowsheets  Taken 11/9/2021 0004  Trust Relationship/Rapport:   care explained   choices provided   emotional support provided   empathic listening provided   questions answered   questions encouraged   reassurance provided   thoughts/feelings acknowledged  Taken 11/8/2021 2001  Trust Relationship/Rapport:   care explained   choices provided   emotional support provided   empathic listening provided   questions answered   questions encouraged   reassurance provided   thoughts/feelings acknowledged  Goal: Readiness for Transition of Care  Intervention: Mutually Develop Transition Plan  Flowsheets (Taken 11/9/2021 0247)  Equipment Currently Used at Home: none  Transportation Anticipated:   car, drives self   family or friend will provide  Patient/Family Anticipated Services at Transition: none  Patient/Family Anticipates Transition to:   home   home with family     Problem: Hypertension Comorbidity  Goal: Blood Pressure in Desired Range  Intervention: Maintain Hypertension-Management Strategies  Flowsheets  Taken 11/8/2021 2211  Medication Review/Management: medications reviewed  Taken 11/8/2021 2001  Medication Review/Management: medications reviewed     Problem: Chest Pain  Goal: Resolution of Chest Pain Symptoms  Intervention: Manage Acute Chest Pain  Flowsheets (Taken 11/9/2021 0430)  Chest Pain Intervention:   cardiac biomarkers drawn   cardiac monitoring continued   12-lead ECG obtained   activity minimized   see  MAR   oxygen applied     Problem: Nausea and Vomiting  Goal: Fluid and Electrolyte Balance  Intervention: Prevent and Manage Nausea and Vomiting  Flowsheets (Taken 11/9/2021 9560)  Nausea/Vomiting Interventions: see MAR  Environmental Support:   calm environment promoted   rest periods encouraged   Goal Outcome Evaluation:  Plan of Care Reviewed With: patient        Progress: improving  Outcome Summary: Pt reporting chest pain @ start of shift. Medicated per MAR. STAT EKG showed ST. STAT trop negative. VSS @ this time. No c/o pain or discomfort @ this time. ST to NSR on tele. O2 in use @ 10L via HFNC. Up ad giles. Clear liquid diet. Makes needs known. Resting comfortably @ this time. Bed locked and in lowest position. Side rails up x2. Call light within reach. Will continue to assess.

## 2021-11-09 NOTE — CASE MANAGEMENT/SOCIAL WORK
Continued Stay Note  [facility]     Patient Name: Flo Willoughby  MRN: [id number]  Today's Date: 11/9/2021    Admit Date: 11/8/2021     Discharge Plan     Row Name 11/09/21 1103       Plan    Plan Pending    Plan Comments Patient is in covid-19 isolation. CCP attempted to call the bedside phone to screen patient and discuss d/c planning but patient did not . CCP to follow for needs and attempt to screen again. JOHN Leslie               Discharge Codes    No documentation.                     JOHN JORDAN

## 2021-11-10 LAB
ALBUMIN SERPL-MCNC: 3.5 G/DL (ref 3.5–5.2)
ALBUMIN/GLOB SERPL: 1.1 G/DL
ALP SERPL-CCNC: 64 U/L (ref 39–117)
ALT SERPL W P-5'-P-CCNC: 31 U/L (ref 1–41)
ANION GAP SERPL CALCULATED.3IONS-SCNC: 10.7 MMOL/L (ref 5–15)
AST SERPL-CCNC: 24 U/L (ref 1–40)
BASOPHILS # BLD AUTO: 0.03 10*3/MM3 (ref 0–0.2)
BASOPHILS NFR BLD AUTO: 0.2 % (ref 0–1.5)
BILIRUB SERPL-MCNC: 0.4 MG/DL (ref 0–1.2)
BUN SERPL-MCNC: 14 MG/DL (ref 6–20)
BUN/CREAT SERPL: 25.5 (ref 7–25)
CALCIUM SPEC-SCNC: 8.9 MG/DL (ref 8.6–10.5)
CHLORIDE SERPL-SCNC: 106 MMOL/L (ref 98–107)
CO2 SERPL-SCNC: 23.3 MMOL/L (ref 22–29)
CREAT SERPL-MCNC: 0.55 MG/DL (ref 0.76–1.27)
CRP SERPL-MCNC: 9.05 MG/DL (ref 0–0.5)
DEPRECATED RDW RBC AUTO: 39.1 FL (ref 37–54)
EOSINOPHIL # BLD AUTO: 0 10*3/MM3 (ref 0–0.4)
EOSINOPHIL NFR BLD AUTO: 0 % (ref 0.3–6.2)
ERYTHROCYTE [DISTWIDTH] IN BLOOD BY AUTOMATED COUNT: 12.4 % (ref 12.3–15.4)
FERRITIN SERPL-MCNC: 1925 NG/ML (ref 30–400)
GFR SERPL CREATININE-BSD FRML MDRD: >150 ML/MIN/1.73
GLOBULIN UR ELPH-MCNC: 3.1 GM/DL
GLUCOSE BLDC GLUCOMTR-MCNC: 150 MG/DL (ref 70–130)
GLUCOSE BLDC GLUCOMTR-MCNC: 165 MG/DL (ref 70–130)
GLUCOSE BLDC GLUCOMTR-MCNC: 184 MG/DL (ref 70–130)
GLUCOSE BLDC GLUCOMTR-MCNC: 186 MG/DL (ref 70–130)
GLUCOSE BLDC GLUCOMTR-MCNC: 194 MG/DL (ref 70–130)
GLUCOSE SERPL-MCNC: 189 MG/DL (ref 65–99)
HCT VFR BLD AUTO: 41.9 % (ref 37.5–51)
HGB BLD-MCNC: 14.6 G/DL (ref 13–17.7)
IMM GRANULOCYTES # BLD AUTO: 0.27 10*3/MM3 (ref 0–0.05)
IMM GRANULOCYTES NFR BLD AUTO: 1.9 % (ref 0–0.5)
LYMPHOCYTES # BLD AUTO: 1.62 10*3/MM3 (ref 0.7–3.1)
LYMPHOCYTES NFR BLD AUTO: 11.3 % (ref 19.6–45.3)
MCH RBC QN AUTO: 30.2 PG (ref 26.6–33)
MCHC RBC AUTO-ENTMCNC: 34.8 G/DL (ref 31.5–35.7)
MCV RBC AUTO: 86.6 FL (ref 79–97)
MONOCYTES # BLD AUTO: 0.6 10*3/MM3 (ref 0.1–0.9)
MONOCYTES NFR BLD AUTO: 4.2 % (ref 5–12)
NEUTROPHILS NFR BLD AUTO: 11.8 10*3/MM3 (ref 1.7–7)
NEUTROPHILS NFR BLD AUTO: 82.4 % (ref 42.7–76)
NRBC BLD AUTO-RTO: 0 /100 WBC (ref 0–0.2)
PLATELET # BLD AUTO: 360 10*3/MM3 (ref 140–450)
PMV BLD AUTO: 9.4 FL (ref 6–12)
POTASSIUM SERPL-SCNC: 4 MMOL/L (ref 3.5–5.2)
PROT SERPL-MCNC: 6.6 G/DL (ref 6–8.5)
RBC # BLD AUTO: 4.84 10*6/MM3 (ref 4.14–5.8)
SODIUM SERPL-SCNC: 140 MMOL/L (ref 136–145)
WBC # BLD AUTO: 14.32 10*3/MM3 (ref 3.4–10.8)

## 2021-11-10 PROCEDURE — 25010000002 ENOXAPARIN PER 10 MG: Performed by: NURSE PRACTITIONER

## 2021-11-10 PROCEDURE — 82104 ALPHA-1-ANTITRYPSIN PHENO: CPT | Performed by: INTERNAL MEDICINE

## 2021-11-10 PROCEDURE — 86140 C-REACTIVE PROTEIN: CPT | Performed by: NURSE PRACTITIONER

## 2021-11-10 PROCEDURE — 85025 COMPLETE CBC W/AUTO DIFF WBC: CPT | Performed by: NURSE PRACTITIONER

## 2021-11-10 PROCEDURE — 82728 ASSAY OF FERRITIN: CPT | Performed by: NURSE PRACTITIONER

## 2021-11-10 PROCEDURE — 63710000001 DEXAMETHASONE PER 0.25 MG: Performed by: NURSE PRACTITIONER

## 2021-11-10 PROCEDURE — 80053 COMPREHEN METABOLIC PANEL: CPT | Performed by: NURSE PRACTITIONER

## 2021-11-10 PROCEDURE — 82103 ALPHA-1-ANTITRYPSIN TOTAL: CPT | Performed by: INTERNAL MEDICINE

## 2021-11-10 PROCEDURE — 63710000001 INSULIN LISPRO (HUMAN) PER 5 UNITS: Performed by: NURSE PRACTITIONER

## 2021-11-10 PROCEDURE — 94799 UNLISTED PULMONARY SVC/PX: CPT

## 2021-11-10 PROCEDURE — 82962 GLUCOSE BLOOD TEST: CPT

## 2021-11-10 PROCEDURE — 94760 N-INVAS EAR/PLS OXIMETRY 1: CPT

## 2021-11-10 PROCEDURE — 99232 SBSQ HOSP IP/OBS MODERATE 35: CPT | Performed by: INTERNAL MEDICINE

## 2021-11-10 RX ORDER — PANTOPRAZOLE SODIUM 40 MG/1
40 TABLET, DELAYED RELEASE ORAL
Status: DISCONTINUED | OUTPATIENT
Start: 2021-11-11 | End: 2021-11-16 | Stop reason: HOSPADM

## 2021-11-10 RX ADMIN — CYCLOBENZAPRINE 5 MG: 10 TABLET, FILM COATED ORAL at 21:31

## 2021-11-10 RX ADMIN — BUDESONIDE AND FORMOTEROL FUMARATE DIHYDRATE 2 PUFF: 160; 4.5 AEROSOL RESPIRATORY (INHALATION) at 20:41

## 2021-11-10 RX ADMIN — GABAPENTIN 100 MG: 100 CAPSULE ORAL at 17:43

## 2021-11-10 RX ADMIN — BARICITINIB 4 MG: 2 TABLET, FILM COATED ORAL at 13:35

## 2021-11-10 RX ADMIN — DEXAMETHASONE 6 MG: 4 TABLET ORAL at 21:31

## 2021-11-10 RX ADMIN — ENOXAPARIN SODIUM 40 MG: 40 INJECTION SUBCUTANEOUS at 12:43

## 2021-11-10 RX ADMIN — INSULIN LISPRO 2 UNITS: 100 INJECTION, SOLUTION INTRAVENOUS; SUBCUTANEOUS at 21:31

## 2021-11-10 RX ADMIN — PANTOPRAZOLE SODIUM 40 MG: 40 INJECTION, POWDER, FOR SOLUTION INTRAVENOUS at 05:36

## 2021-11-10 RX ADMIN — METOPROLOL TARTRATE 50 MG: 50 TABLET, FILM COATED ORAL at 08:48

## 2021-11-10 RX ADMIN — GABAPENTIN 100 MG: 100 CAPSULE ORAL at 08:48

## 2021-11-10 RX ADMIN — INSULIN LISPRO 2 UNITS: 100 INJECTION, SOLUTION INTRAVENOUS; SUBCUTANEOUS at 17:42

## 2021-11-10 RX ADMIN — DEXAMETHASONE 6 MG: 4 TABLET ORAL at 08:48

## 2021-11-10 RX ADMIN — ATORVASTATIN CALCIUM 20 MG: 20 TABLET, FILM COATED ORAL at 21:31

## 2021-11-10 RX ADMIN — METOPROLOL TARTRATE 50 MG: 50 TABLET, FILM COATED ORAL at 21:31

## 2021-11-10 RX ADMIN — REMDESIVIR 100 MG: 100 INJECTION, POWDER, LYOPHILIZED, FOR SOLUTION INTRAVENOUS at 12:42

## 2021-11-10 RX ADMIN — GABAPENTIN 100 MG: 100 CAPSULE ORAL at 21:31

## 2021-11-10 RX ADMIN — BUDESONIDE AND FORMOTEROL FUMARATE DIHYDRATE 2 PUFF: 160; 4.5 AEROSOL RESPIRATORY (INHALATION) at 07:49

## 2021-11-10 NOTE — PLAN OF CARE
Goal Outcome Evaluation:              Outcome Summary: Pt on 15L humidified HiFlow, A&Ox4, no complaints of pain.

## 2021-11-10 NOTE — PROGRESS NOTES
[facility]     Mar/chart reviewed  Follow up COVID  Still with some shortness of breath    Vital Sign Min/Max for last 24 hours  Temp  Min: 96.4 °F (35.8 °C)  Max: 98 °F (36.7 °C)   BP  Min: 119/82  Max: 137/84   Pulse  Min: 65  Max: 85   Resp  Min: 16  Max: 20   SpO2  Min: 90 %  Max: 93 %   Flow (L/min)  Min: 12  Max: 15   No data recorded     Appears ill, axox3,   perrl, eomi, no icterus,  mmm, no jvd, trachea midline, neck supple,  chest decreased ae bilaterally, no crackles, no wheezes,   rrr,   soft, nt, nd +bs,  no c/c/ e  Skin warm, dry no rashes    Labs: 11/10: reviewed:  Bun 14  Cr 0.55  Bicarb 23  Wbc 14.3  hgb 14.6  plts 360  crp 9 (down from 15)      A/P:  1. COVID 19 pneumonia -- severe case, continue dexamethasone, barcitinib  2. Emphysema -- more extensive than I would expect for a 42yo, will order alpha one.  3. AhRF --oxygen as needed  4. Obesity

## 2021-11-10 NOTE — PLAN OF CARE
Goal Outcome Evaluation:  Plan of Care Reviewed With: patient        Progress: no change  Outcome Summary: patient had no c/o pain. A & O x 4, up ad giles, SR to SB on monitor, SATs 94 0n 15 L HHFNC. Diet changed to heart healthy carb consistent. Resting comfortably in bed, call light within reach, will continue to monitor.

## 2021-11-10 NOTE — PROGRESS NOTES
ID note for COVID-19 infection  Subjective: He is feeling little bit better.  Remains on high flow at 15 L.  Tolerating baricitinib  Objective: Afebrile, sickly appearing but nontoxic, lungs with scattered rales but normal effort, appropriate mood and affect    CRP down to 9.1  White count 14.3  Creatinine 0.55    A/p  1.  Acute hypoxic respiratory failure  2.  COVID-19 pneumonia  3.  Acute lung injury/hyper inflammatory state    Continue baricitinib 4 mg p.o. every 24 hours for up to 14-day course.  Tolerating nicely.  CRP down.  Seems to be making some progress.  I will be available if additional concerns evolve.

## 2021-11-11 LAB
ALBUMIN SERPL-MCNC: 3.5 G/DL (ref 3.5–5.2)
ALBUMIN/GLOB SERPL: 1.3 G/DL
ALP SERPL-CCNC: 62 U/L (ref 39–117)
ALT SERPL W P-5'-P-CCNC: 35 U/L (ref 1–41)
ANION GAP SERPL CALCULATED.3IONS-SCNC: 9.7 MMOL/L (ref 5–15)
AST SERPL-CCNC: 22 U/L (ref 1–40)
BASOPHILS # BLD AUTO: 0.05 10*3/MM3 (ref 0–0.2)
BASOPHILS NFR BLD AUTO: 0.3 % (ref 0–1.5)
BILIRUB SERPL-MCNC: 0.3 MG/DL (ref 0–1.2)
BUN SERPL-MCNC: 15 MG/DL (ref 6–20)
BUN/CREAT SERPL: 21.1 (ref 7–25)
CALCIUM SPEC-SCNC: 8.7 MG/DL (ref 8.6–10.5)
CHLORIDE SERPL-SCNC: 106 MMOL/L (ref 98–107)
CO2 SERPL-SCNC: 24.3 MMOL/L (ref 22–29)
CREAT SERPL-MCNC: 0.71 MG/DL (ref 0.76–1.27)
CRP SERPL-MCNC: 3.89 MG/DL (ref 0–0.5)
D DIMER PPP FEU-MCNC: 0.29 MCGFEU/ML (ref 0–0.49)
DEPRECATED RDW RBC AUTO: 40 FL (ref 37–54)
EOSINOPHIL # BLD AUTO: 0 10*3/MM3 (ref 0–0.4)
EOSINOPHIL NFR BLD AUTO: 0 % (ref 0.3–6.2)
ERYTHROCYTE [DISTWIDTH] IN BLOOD BY AUTOMATED COUNT: 12.5 % (ref 12.3–15.4)
FERRITIN SERPL-MCNC: 1647 NG/ML (ref 30–400)
GFR SERPL CREATININE-BSD FRML MDRD: 122 ML/MIN/1.73
GLOBULIN UR ELPH-MCNC: 2.8 GM/DL
GLUCOSE BLDC GLUCOMTR-MCNC: 144 MG/DL (ref 70–130)
GLUCOSE BLDC GLUCOMTR-MCNC: 161 MG/DL (ref 70–130)
GLUCOSE BLDC GLUCOMTR-MCNC: 168 MG/DL (ref 70–130)
GLUCOSE BLDC GLUCOMTR-MCNC: 214 MG/DL (ref 70–130)
GLUCOSE BLDC GLUCOMTR-MCNC: 248 MG/DL (ref 70–130)
GLUCOSE SERPL-MCNC: 173 MG/DL (ref 65–99)
HCT VFR BLD AUTO: 42.7 % (ref 37.5–51)
HGB BLD-MCNC: 14.5 G/DL (ref 13–17.7)
IMM GRANULOCYTES # BLD AUTO: 0.34 10*3/MM3 (ref 0–0.05)
IMM GRANULOCYTES NFR BLD AUTO: 1.8 % (ref 0–0.5)
LDH SERPL-CCNC: 445 U/L (ref 135–225)
LYMPHOCYTES # BLD AUTO: 1.47 10*3/MM3 (ref 0.7–3.1)
LYMPHOCYTES NFR BLD AUTO: 7.6 % (ref 19.6–45.3)
MCH RBC QN AUTO: 29.9 PG (ref 26.6–33)
MCHC RBC AUTO-ENTMCNC: 34 G/DL (ref 31.5–35.7)
MCV RBC AUTO: 88 FL (ref 79–97)
MONOCYTES # BLD AUTO: 0.73 10*3/MM3 (ref 0.1–0.9)
MONOCYTES NFR BLD AUTO: 3.8 % (ref 5–12)
NEUTROPHILS NFR BLD AUTO: 16.82 10*3/MM3 (ref 1.7–7)
NEUTROPHILS NFR BLD AUTO: 86.5 % (ref 42.7–76)
NRBC BLD AUTO-RTO: 0.1 /100 WBC (ref 0–0.2)
PLATELET # BLD AUTO: 415 10*3/MM3 (ref 140–450)
PMV BLD AUTO: 9.5 FL (ref 6–12)
POTASSIUM SERPL-SCNC: 4.5 MMOL/L (ref 3.5–5.2)
PROCALCITONIN SERPL-MCNC: 0.06 NG/ML (ref 0–0.25)
PROT SERPL-MCNC: 6.3 G/DL (ref 6–8.5)
RBC # BLD AUTO: 4.85 10*6/MM3 (ref 4.14–5.8)
SODIUM SERPL-SCNC: 140 MMOL/L (ref 136–145)
WBC # BLD AUTO: 19.41 10*3/MM3 (ref 3.4–10.8)

## 2021-11-11 PROCEDURE — 84145 PROCALCITONIN (PCT): CPT | Performed by: NURSE PRACTITIONER

## 2021-11-11 PROCEDURE — 63710000001 DEXAMETHASONE PER 0.25 MG: Performed by: NURSE PRACTITIONER

## 2021-11-11 PROCEDURE — 85025 COMPLETE CBC W/AUTO DIFF WBC: CPT | Performed by: NURSE PRACTITIONER

## 2021-11-11 PROCEDURE — 86140 C-REACTIVE PROTEIN: CPT | Performed by: NURSE PRACTITIONER

## 2021-11-11 PROCEDURE — 99232 SBSQ HOSP IP/OBS MODERATE 35: CPT | Performed by: INTERNAL MEDICINE

## 2021-11-11 PROCEDURE — 36415 COLL VENOUS BLD VENIPUNCTURE: CPT | Performed by: NURSE PRACTITIONER

## 2021-11-11 PROCEDURE — 83615 LACTATE (LD) (LDH) ENZYME: CPT | Performed by: NURSE PRACTITIONER

## 2021-11-11 PROCEDURE — 94799 UNLISTED PULMONARY SVC/PX: CPT

## 2021-11-11 PROCEDURE — 85379 FIBRIN DEGRADATION QUANT: CPT | Performed by: NURSE PRACTITIONER

## 2021-11-11 PROCEDURE — 80053 COMPREHEN METABOLIC PANEL: CPT | Performed by: NURSE PRACTITIONER

## 2021-11-11 PROCEDURE — 82728 ASSAY OF FERRITIN: CPT | Performed by: NURSE PRACTITIONER

## 2021-11-11 PROCEDURE — 25010000002 ENOXAPARIN PER 10 MG: Performed by: NURSE PRACTITIONER

## 2021-11-11 PROCEDURE — 82962 GLUCOSE BLOOD TEST: CPT

## 2021-11-11 PROCEDURE — 63710000001 INSULIN LISPRO (HUMAN) PER 5 UNITS: Performed by: NURSE PRACTITIONER

## 2021-11-11 RX ADMIN — GABAPENTIN 100 MG: 100 CAPSULE ORAL at 08:44

## 2021-11-11 RX ADMIN — DEXAMETHASONE 6 MG: 4 TABLET ORAL at 20:33

## 2021-11-11 RX ADMIN — PANTOPRAZOLE SODIUM 40 MG: 40 TABLET, DELAYED RELEASE ORAL at 05:30

## 2021-11-11 RX ADMIN — INSULIN LISPRO 2 UNITS: 100 INJECTION, SOLUTION INTRAVENOUS; SUBCUTANEOUS at 11:41

## 2021-11-11 RX ADMIN — METOPROLOL TARTRATE 50 MG: 50 TABLET, FILM COATED ORAL at 20:33

## 2021-11-11 RX ADMIN — INSULIN LISPRO 4 UNITS: 100 INJECTION, SOLUTION INTRAVENOUS; SUBCUTANEOUS at 22:55

## 2021-11-11 RX ADMIN — REMDESIVIR 100 MG: 100 INJECTION, POWDER, LYOPHILIZED, FOR SOLUTION INTRAVENOUS at 12:57

## 2021-11-11 RX ADMIN — GABAPENTIN 100 MG: 100 CAPSULE ORAL at 16:32

## 2021-11-11 RX ADMIN — BUDESONIDE AND FORMOTEROL FUMARATE DIHYDRATE 2 PUFF: 160; 4.5 AEROSOL RESPIRATORY (INHALATION) at 06:57

## 2021-11-11 RX ADMIN — GABAPENTIN 100 MG: 100 CAPSULE ORAL at 20:33

## 2021-11-11 RX ADMIN — DEXAMETHASONE 6 MG: 4 TABLET ORAL at 08:44

## 2021-11-11 RX ADMIN — INSULIN LISPRO 2 UNITS: 100 INJECTION, SOLUTION INTRAVENOUS; SUBCUTANEOUS at 08:44

## 2021-11-11 RX ADMIN — CYCLOBENZAPRINE 5 MG: 10 TABLET, FILM COATED ORAL at 20:42

## 2021-11-11 RX ADMIN — BARICITINIB 4 MG: 2 TABLET, FILM COATED ORAL at 14:07

## 2021-11-11 RX ADMIN — METOPROLOL TARTRATE 50 MG: 50 TABLET, FILM COATED ORAL at 08:44

## 2021-11-11 RX ADMIN — ENOXAPARIN SODIUM 40 MG: 40 INJECTION SUBCUTANEOUS at 12:57

## 2021-11-11 RX ADMIN — BUDESONIDE AND FORMOTEROL FUMARATE DIHYDRATE 2 PUFF: 160; 4.5 AEROSOL RESPIRATORY (INHALATION) at 20:20

## 2021-11-11 RX ADMIN — ATORVASTATIN CALCIUM 20 MG: 20 TABLET, FILM COATED ORAL at 20:33

## 2021-11-11 NOTE — PLAN OF CARE
Goal Outcome Evaluation:  Plan of Care Reviewed With: patient        Progress: no change  Outcome Summary: Patient continues to be on 15 L HHFNC with SATs in low 90s. SR to SB on monitor. Ad giles, A&O x 4. Will continue to monitor.

## 2021-11-11 NOTE — PROGRESS NOTES
" LOS: 3 days     Name: Flo Willoughby  Age: 41 y.o.  Sex: male  :  1980  MRN: [id number]         Primary Care Physician: Provider, No Known    Subjective   Subjective  Still feeling short of breath with exertion.  Improves with rest.  Minimal cough.  Denies fevers or chills.  Weaned down to 10 L high flow oxygen this morning.    Objective   Vital Signs  Temp:  [97.6 °F (36.4 °C)-98.5 °F (36.9 °C)] 97.6 °F (36.4 °C)  Heart Rate:  [59-84] 59  Resp:  [20] 20  BP: (115-135)/(74-85) 115/77  Body mass index is 37.12 kg/m².    Objective:  General Appearance:  Comfortable, in no acute distress and ill-appearing.    Vital signs: (most recent): Blood pressure 115/77, pulse 59, temperature 97.6 °F (36.4 °C), temperature source Oral, resp. rate 20, height 167.6 cm (66\"), weight 104 kg (230 lb), SpO2 94 %.    Lungs:  Normal effort and normal respiratory rate.  There are decreased breath sounds.    Heart: Normal rate.  Regular rhythm.    Abdomen: Abdomen is soft.  Bowel sounds are normal.   There is no abdominal tenderness.     Extremities: There is no dependent edema or local swelling.    Neurological: Patient is alert and oriented to person, place and time.    Skin:  Warm and dry.              Results Review:       I reviewed the patient's new clinical results.    Results from last 7 days   Lab Units 11/11/21  0249 11/10/21  0500 11/09/21  0612 21  0910   WBC 10*3/mm3 19.41* 14.32* 10.75 15.99*   HEMOGLOBIN g/dL 14.5 14.6 15.0 15.6   PLATELETS 10*3/mm3 415 360 324 329     Results from last 7 days   Lab Units 11/11/21  0249 11/10/21  0500 21  0612 21  1431 21  0910   SODIUM mmol/L 140 140 139  --  143   POTASSIUM mmol/L 4.5 4.0 4.2  --  3.6   CHLORIDE mmol/L 106 106 103  --  104   CO2 mmol/L 24.3 23.3 24.1  --  23.5   BUN mg/dL 15 14 14  --  12   CREATININE mg/dL 0.71* 0.55* 0.59* 0.81 0.67*   CALCIUM mg/dL 8.7 8.9 8.7  --  8.7   GLUCOSE mg/dL 173* 189* 192*  --  140*                 Scheduled Meds: "   atorvastatin, 20 mg, Oral, Nightly  baricitinib, 4 mg, Oral, Daily  budesonide-formoterol, 2 puff, Inhalation, BID - RT  dexamethasone, 6 mg, Oral, Q12H  enoxaparin, 40 mg, Subcutaneous, Q24H  gabapentin, 100 mg, Oral, TID  insulin lispro, 0-9 Units, Subcutaneous, 4x Daily With Meals & Nightly  metoprolol tartrate, 50 mg, Oral, Q12H  pantoprazole, 40 mg, Oral, Q AM  remdesivir, 100 mg, Intravenous, Q24H      PRN Meds:   •  acetaminophen  •  cyclobenzaprine  •  dextrose  •  dextrose  •  glucagon (human recombinant)  •  melatonin  •  Morphine  •  nitroglycerin  •  ondansetron  •  Pharmacy to Dose Baricitinib (COVID-19)  Continuous Infusions:  Pharmacy to Dose Baricitinib (COVID-19),         Assessment/Plan   Active Hospital Problems    Diagnosis  POA   • **Pneumonia due to COVID-19 virus [U07.1, J12.82]  Yes   • Cytokine release syndrome, grade unspecified [D89.839]  Yes   • Prediabetes [R73.03]  Yes   • Acute respiratory failure with hypoxia (HCC) [J96.01]  Yes   • HTN (hypertension) [I10]  Yes   • Former smoker [Z87.891]  Not Applicable   • Class 2 obesity in adult [E66.9]  Yes      Resolved Hospital Problems   No resolved problems to display.       Assessment & Plan    -Continue Decadron and remdesivir, day 4.  Baricitinib day 3.  CRP downtrending.  Have been able to wean him to 10 L high flow from 15 yesterday.  Continue aggressive pulmonary measures.  -Pulmonology checking alpha-1 antitrypsin  -Blood sugars reasonably controlled and will continue current regimen  -Lovenox for DVT prophylaxis        I wore full protective equipment throughout the patient encounter including eye protection and facemask.  Hand hygiene was performed before donning protective equipment and after removal when leaving the room.    Juan José Brizuela MD  [facility]  11/11/21  08:57 EST

## 2021-11-11 NOTE — PLAN OF CARE
Goal Outcome Evaluation:  Plan of Care Reviewed With: patient      Instructions given on IS use, patient is tolerating well. Oxygen had to be turned up to 11 Liters to maintain oxygen saturations above 90%. No c/o pain n/v or soa this shift. Continue to monitor

## 2021-11-11 NOTE — PROGRESS NOTES
[facility]      Mar/chart reviewed  Follow up COVID  Still with some shortness of breath    Vital Sign Min/Max for last 24 hours  Temp  Min: 96.8 °F (36 °C)  Max: 98.5 °F (36.9 °C)   BP  Min: 115/77  Max: 135/84   Pulse  Min: 59  Max: 84   Resp  Min: 20  Max: 20   SpO2  Min: 90 %  Max: 95 %   Flow (L/min)  Min: 10  Max: 15   No data recorded   Appears ill, axox3,   perrl, eomi, no icterus,  mmm, no jvd, trachea midline, neck supple,  chest decreased ae bilaterally, no crackles, no wheezes,   rrr,   soft, nt, nd +bs,  no c/c/ e  Skin warm, dry no rashes    Labs: 11/11: reviewed;    Cr 0.7  crp 3.89  procal 0.06    A/P:  1. COVID 19 pneumonia -- severe case, continue dexamethasone, barcitinib  2. Emphysema -- more extensive than I would expect for a 42yo,  alpha one order.  3. AhRF --oxygen as needed  4. Obesity    Making progress

## 2021-11-11 NOTE — PROGRESS NOTES
"ID note for COVID-19 infection  Subjective: He is feeling better. O2 down to 10L. Having some \"twitches\" (maybe due to dexamethasone?). No f/c/ns      Objective: Afebrile, sickly appearing but nontoxic, lungs with scattered rales but normal effort, appropriate mood and affect    CRP down to 3.89  White count 19  Creatinine 0.7    A/p  1.  Acute hypoxic respiratory failure  2.  COVID-19 pneumonia  3.  Acute lung injury/hyper inflammatory state    Continue baricitinib 4 mg p.o. every 24 hours until discharge for up to 14-day course, whichever comes first. Procalcitonin is negative, D-dimer is normal and CRP is much improved.  White count is elevated but this is likely due to the steroids.    "

## 2021-11-12 LAB
ALBUMIN SERPL-MCNC: 3.6 G/DL (ref 3.5–5.2)
ALBUMIN/GLOB SERPL: 1.3 G/DL
ALP SERPL-CCNC: 72 U/L (ref 39–117)
ALT SERPL W P-5'-P-CCNC: 40 U/L (ref 1–41)
ANION GAP SERPL CALCULATED.3IONS-SCNC: 8.5 MMOL/L (ref 5–15)
AST SERPL-CCNC: 16 U/L (ref 1–40)
BASOPHILS # BLD AUTO: 0.07 10*3/MM3 (ref 0–0.2)
BASOPHILS NFR BLD AUTO: 0.3 % (ref 0–1.5)
BILIRUB SERPL-MCNC: 0.3 MG/DL (ref 0–1.2)
BUN SERPL-MCNC: 16 MG/DL (ref 6–20)
BUN/CREAT SERPL: 18.4 (ref 7–25)
CALCIUM SPEC-SCNC: 8.8 MG/DL (ref 8.6–10.5)
CHLORIDE SERPL-SCNC: 105 MMOL/L (ref 98–107)
CO2 SERPL-SCNC: 25.5 MMOL/L (ref 22–29)
CREAT SERPL-MCNC: 0.87 MG/DL (ref 0.76–1.27)
CRP SERPL-MCNC: 1.75 MG/DL (ref 0–0.5)
DEPRECATED RDW RBC AUTO: 40 FL (ref 37–54)
EOSINOPHIL # BLD AUTO: 0.01 10*3/MM3 (ref 0–0.4)
EOSINOPHIL NFR BLD AUTO: 0 % (ref 0.3–6.2)
ERYTHROCYTE [DISTWIDTH] IN BLOOD BY AUTOMATED COUNT: 12.8 % (ref 12.3–15.4)
FERRITIN SERPL-MCNC: 1296 NG/ML (ref 30–400)
GFR SERPL CREATININE-BSD FRML MDRD: 96 ML/MIN/1.73
GLOBULIN UR ELPH-MCNC: 2.7 GM/DL
GLUCOSE BLDC GLUCOMTR-MCNC: 143 MG/DL (ref 70–130)
GLUCOSE BLDC GLUCOMTR-MCNC: 174 MG/DL (ref 70–130)
GLUCOSE BLDC GLUCOMTR-MCNC: 194 MG/DL (ref 70–130)
GLUCOSE BLDC GLUCOMTR-MCNC: 245 MG/DL (ref 70–130)
GLUCOSE SERPL-MCNC: 165 MG/DL (ref 65–99)
HCT VFR BLD AUTO: 43.5 % (ref 37.5–51)
HGB BLD-MCNC: 15 G/DL (ref 13–17.7)
IMM GRANULOCYTES # BLD AUTO: 0.91 10*3/MM3 (ref 0–0.05)
IMM GRANULOCYTES NFR BLD AUTO: 4.5 % (ref 0–0.5)
LYMPHOCYTES # BLD AUTO: 1.42 10*3/MM3 (ref 0.7–3.1)
LYMPHOCYTES NFR BLD AUTO: 7 % (ref 19.6–45.3)
MCH RBC QN AUTO: 30.1 PG (ref 26.6–33)
MCHC RBC AUTO-ENTMCNC: 34.5 G/DL (ref 31.5–35.7)
MCV RBC AUTO: 87.2 FL (ref 79–97)
MONOCYTES # BLD AUTO: 0.78 10*3/MM3 (ref 0.1–0.9)
MONOCYTES NFR BLD AUTO: 3.9 % (ref 5–12)
NEUTROPHILS NFR BLD AUTO: 17.05 10*3/MM3 (ref 1.7–7)
NEUTROPHILS NFR BLD AUTO: 84.3 % (ref 42.7–76)
NRBC BLD AUTO-RTO: 0.1 /100 WBC (ref 0–0.2)
PLATELET # BLD AUTO: 467 10*3/MM3 (ref 140–450)
PMV BLD AUTO: 9.9 FL (ref 6–12)
POTASSIUM SERPL-SCNC: 4.7 MMOL/L (ref 3.5–5.2)
PROT SERPL-MCNC: 6.3 G/DL (ref 6–8.5)
RBC # BLD AUTO: 4.99 10*6/MM3 (ref 4.14–5.8)
SODIUM SERPL-SCNC: 139 MMOL/L (ref 136–145)
WBC # BLD AUTO: 20.24 10*3/MM3 (ref 3.4–10.8)

## 2021-11-12 PROCEDURE — 86140 C-REACTIVE PROTEIN: CPT | Performed by: INTERNAL MEDICINE

## 2021-11-12 PROCEDURE — 82728 ASSAY OF FERRITIN: CPT | Performed by: NURSE PRACTITIONER

## 2021-11-12 PROCEDURE — 94799 UNLISTED PULMONARY SVC/PX: CPT

## 2021-11-12 PROCEDURE — 82962 GLUCOSE BLOOD TEST: CPT

## 2021-11-12 PROCEDURE — 80053 COMPREHEN METABOLIC PANEL: CPT | Performed by: INTERNAL MEDICINE

## 2021-11-12 PROCEDURE — 63710000001 DEXAMETHASONE PER 0.25 MG: Performed by: NURSE PRACTITIONER

## 2021-11-12 PROCEDURE — 36415 COLL VENOUS BLD VENIPUNCTURE: CPT | Performed by: INTERNAL MEDICINE

## 2021-11-12 PROCEDURE — 99232 SBSQ HOSP IP/OBS MODERATE 35: CPT | Performed by: INTERNAL MEDICINE

## 2021-11-12 PROCEDURE — 85025 COMPLETE CBC W/AUTO DIFF WBC: CPT | Performed by: INTERNAL MEDICINE

## 2021-11-12 PROCEDURE — 63710000001 INSULIN LISPRO (HUMAN) PER 5 UNITS: Performed by: NURSE PRACTITIONER

## 2021-11-12 PROCEDURE — 25010000002 ENOXAPARIN PER 10 MG: Performed by: NURSE PRACTITIONER

## 2021-11-12 RX ADMIN — BUDESONIDE AND FORMOTEROL FUMARATE DIHYDRATE 2 PUFF: 160; 4.5 AEROSOL RESPIRATORY (INHALATION) at 09:32

## 2021-11-12 RX ADMIN — ENOXAPARIN SODIUM 40 MG: 40 INJECTION SUBCUTANEOUS at 12:23

## 2021-11-12 RX ADMIN — REMDESIVIR 100 MG: 100 INJECTION, POWDER, LYOPHILIZED, FOR SOLUTION INTRAVENOUS at 12:23

## 2021-11-12 RX ADMIN — INSULIN LISPRO 2 UNITS: 100 INJECTION, SOLUTION INTRAVENOUS; SUBCUTANEOUS at 17:00

## 2021-11-12 RX ADMIN — CYCLOBENZAPRINE 5 MG: 10 TABLET, FILM COATED ORAL at 20:11

## 2021-11-12 RX ADMIN — DEXAMETHASONE 6 MG: 4 TABLET ORAL at 20:11

## 2021-11-12 RX ADMIN — GABAPENTIN 100 MG: 100 CAPSULE ORAL at 08:09

## 2021-11-12 RX ADMIN — PANTOPRAZOLE SODIUM 40 MG: 40 TABLET, DELAYED RELEASE ORAL at 08:09

## 2021-11-12 RX ADMIN — GABAPENTIN 100 MG: 100 CAPSULE ORAL at 20:11

## 2021-11-12 RX ADMIN — GABAPENTIN 100 MG: 100 CAPSULE ORAL at 16:59

## 2021-11-12 RX ADMIN — BUDESONIDE AND FORMOTEROL FUMARATE DIHYDRATE 2 PUFF: 160; 4.5 AEROSOL RESPIRATORY (INHALATION) at 19:47

## 2021-11-12 RX ADMIN — DEXAMETHASONE 6 MG: 4 TABLET ORAL at 08:09

## 2021-11-12 RX ADMIN — BARICITINIB 4 MG: 2 TABLET, FILM COATED ORAL at 14:39

## 2021-11-12 RX ADMIN — INSULIN LISPRO 4 UNITS: 100 INJECTION, SOLUTION INTRAVENOUS; SUBCUTANEOUS at 20:10

## 2021-11-12 RX ADMIN — METOPROLOL TARTRATE 50 MG: 50 TABLET, FILM COATED ORAL at 20:11

## 2021-11-12 RX ADMIN — INSULIN LISPRO 2 UNITS: 100 INJECTION, SOLUTION INTRAVENOUS; SUBCUTANEOUS at 12:20

## 2021-11-12 RX ADMIN — METOPROLOL TARTRATE 50 MG: 50 TABLET, FILM COATED ORAL at 08:09

## 2021-11-12 RX ADMIN — ATORVASTATIN CALCIUM 20 MG: 20 TABLET, FILM COATED ORAL at 20:11

## 2021-11-12 NOTE — CASE MANAGEMENT/SOCIAL WORK
Continued Stay Note  [facility]     Patient Name: Flo Willoughby  MRN: [id number]  Today's Date: 11/12/2021    Admit Date: 11/8/2021     Discharge Plan     Row Name 11/12/21 0918       Plan    Plan Home with Family    Plan Comments Plan remains to return home. Patient remains on 11 L. CCP will continue to follow. Yanelis Cuello RN CCP               Discharge Codes    No documentation.                     Yanelis Cuello RN

## 2021-11-12 NOTE — PROGRESS NOTES
ID note for COVID-19 infection  Subjective: Feels about the same.  Intermittent cough.  O2 on 11 L high flow no f/c/ns      Objective: Afebrile, sickly appearing but nontoxic, lungs distant but normal effort, appropriate mood and affect    CRP down to 1.75  White count 20.24  Creatinine 0.87    A/p  1.  Acute hypoxic respiratory failure  2.  COVID-19 pneumonia  3.  Acute lung injury/hyper inflammatory state    Continue baricitinib 4 mg p.o. every 24 hours until discharge for up to 14-day course, whichever comes first.  White count remains elevated, likely secondary to the steroids.  Seems to be in the plateau phase, but inflammatory markers improving and suspect inflammatory process improving.  I think it is just going to take some time for his lungs to heal.

## 2021-11-12 NOTE — PLAN OF CARE
Goal Outcome Evaluation:  Plan of Care Reviewed With: patient        Progress: no change   Remains on 11 liters high flow nasal cannula. Occasional nonproductive cough. Using BSC, tolerates minimal activity. No c/o pain or shortness of breath. Rested well at long intervals. Will continue to monitor.

## 2021-11-12 NOTE — PROGRESS NOTES
" LOS: 4 days     Name: Flo Willoughby  Age: 41 y.o.  Sex: male  :  1980  MRN: [id number]         Primary Care Physician: Provider, No Known    Subjective   Subjective  No new complaints or overnight events.  Remains short of breath with exertion.  Minimal cough.  Denies fever.    Objective   Vital Signs  Temp:  [96.5 °F (35.8 °C)-96.8 °F (36 °C)] 96.5 °F (35.8 °C)  Heart Rate:  [50-77] 50  Resp:  [18-20] 18  BP: (123-131)/(72-81) 130/79  Body mass index is 37.12 kg/m².    Objective:  General Appearance:  Comfortable, in no acute distress and ill-appearing.    Vital signs: (most recent): Blood pressure 130/79, pulse 50, temperature 96.5 °F (35.8 °C), temperature source Oral, resp. rate 18, height 167.6 cm (66\"), weight 104 kg (230 lb), SpO2 90 %.    Lungs:  Normal effort and normal respiratory rate.  He is not in respiratory distress.  There are decreased breath sounds.    Heart: Normal rate.  Regular rhythm.    Abdomen: Abdomen is soft.  Bowel sounds are normal.   There is no abdominal tenderness.     Extremities: There is no dependent edema or local swelling.    Neurological: Patient is alert and oriented to person, place and time.    Skin:  Warm and dry.              Results Review:       I reviewed the patient's new clinical results.    Results from last 7 days   Lab Units 11/12/21  0513 11/11/21  0249 11/10/21  0500 21  0612 21  0910   WBC 10*3/mm3 20.24* 19.41* 14.32* 10.75 15.99*   HEMOGLOBIN g/dL 15.0 14.5 14.6 15.0 15.6   PLATELETS 10*3/mm3 467* 415 360 324 329     Results from last 7 days   Lab Units 11/12/21  0513 11/11/21  0249 11/10/21  0500 21  0612 21  1431 21  0910   SODIUM mmol/L 139 140 140 139  --  143   POTASSIUM mmol/L 4.7 4.5 4.0 4.2  --  3.6   CHLORIDE mmol/L 105 106 106 103  --  104   CO2 mmol/L 25.5 24.3 23.3 24.1  --  23.5   BUN mg/dL 16 15 14 14  --  12   CREATININE mg/dL 0.87 0.71* 0.55* 0.59* 0.81 0.67*   CALCIUM mg/dL 8.8 8.7 8.9 8.7  --  8.7   GLUCOSE " mg/dL 165* 173* 189* 192*  --  140*                 Scheduled Meds:   atorvastatin, 20 mg, Oral, Nightly  baricitinib, 4 mg, Oral, Daily  budesonide-formoterol, 2 puff, Inhalation, BID - RT  dexamethasone, 6 mg, Oral, Q12H  enoxaparin, 40 mg, Subcutaneous, Q24H  gabapentin, 100 mg, Oral, TID  insulin lispro, 0-9 Units, Subcutaneous, 4x Daily With Meals & Nightly  metoprolol tartrate, 50 mg, Oral, Q12H  pantoprazole, 40 mg, Oral, Q AM  remdesivir, 100 mg, Intravenous, Q24H      PRN Meds:   •  acetaminophen  •  cyclobenzaprine  •  dextrose  •  dextrose  •  glucagon (human recombinant)  •  melatonin  •  Morphine  •  nitroglycerin  •  ondansetron  •  Pharmacy to Dose Baricitinib (COVID-19)  Continuous Infusions:  Pharmacy to Dose Baricitinib (COVID-19),         Assessment/Plan   Active Hospital Problems    Diagnosis  POA   • **Pneumonia due to COVID-19 virus [U07.1, J12.82]  Yes   • Cytokine release syndrome, grade unspecified [D89.839]  Yes   • Prediabetes [R73.03]  Yes   • Acute respiratory failure with hypoxia (HCC) [J96.01]  Yes   • HTN (hypertension) [I10]  Yes   • Former smoker [Z87.891]  Not Applicable   • Class 2 obesity in adult [E66.9]  Yes      Resolved Hospital Problems   No resolved problems to display.       Assessment & Plan    -Continue Decadron and remdesivir, day 5.  Baricitinib day 4.  CRP downtrending.    Oxygenation stable the past 24 hours.  Currently on 11 L high flow.  Continue aggressive pulmonary measures.  -Pulmonology checking alpha-1 antitrypsin  -Blood sugars reasonably controlled and will continue current regimen  -Lovenox for DVT prophylaxis      I wore full protective equipment throughout the patient encounter including eye protection and facemask.  Hand hygiene was performed before donning protective equipment and after removal when leaving the room.    Juan José Brizuela MD  [facility]  11/12/21  09:20 EST

## 2021-11-12 NOTE — PLAN OF CARE
Goal Outcome Evaluation:              Outcome Summary: weaned O2 to 10LHF NC; up in chair most of the day; encouraged IS every hour

## 2021-11-13 LAB
ALBUMIN SERPL-MCNC: 3.6 G/DL (ref 3.5–5.2)
ALBUMIN/GLOB SERPL: 1.3 G/DL
ALP SERPL-CCNC: 60 U/L (ref 39–117)
ALT SERPL W P-5'-P-CCNC: 47 U/L (ref 1–41)
ANION GAP SERPL CALCULATED.3IONS-SCNC: 9.2 MMOL/L (ref 5–15)
AST SERPL-CCNC: 18 U/L (ref 1–40)
BACTERIA SPEC AEROBE CULT: NORMAL
BACTERIA SPEC AEROBE CULT: NORMAL
BILIRUB SERPL-MCNC: 0.4 MG/DL (ref 0–1.2)
BUN SERPL-MCNC: 16 MG/DL (ref 6–20)
BUN/CREAT SERPL: 20 (ref 7–25)
CALCIUM SPEC-SCNC: 8.6 MG/DL (ref 8.6–10.5)
CHLORIDE SERPL-SCNC: 102 MMOL/L (ref 98–107)
CO2 SERPL-SCNC: 25.8 MMOL/L (ref 22–29)
CREAT SERPL-MCNC: 0.8 MG/DL (ref 0.76–1.27)
CRP SERPL-MCNC: 0.9 MG/DL (ref 0–0.5)
D DIMER PPP FEU-MCNC: <0.27 MCGFEU/ML (ref 0–0.49)
DEPRECATED RDW RBC AUTO: 42.7 FL (ref 37–54)
ERYTHROCYTE [DISTWIDTH] IN BLOOD BY AUTOMATED COUNT: 12.9 % (ref 12.3–15.4)
FERRITIN SERPL-MCNC: 1214 NG/ML (ref 30–400)
GFR SERPL CREATININE-BSD FRML MDRD: 106 ML/MIN/1.73
GLOBULIN UR ELPH-MCNC: 2.7 GM/DL
GLUCOSE BLDC GLUCOMTR-MCNC: 164 MG/DL (ref 70–130)
GLUCOSE BLDC GLUCOMTR-MCNC: 171 MG/DL (ref 70–130)
GLUCOSE BLDC GLUCOMTR-MCNC: 173 MG/DL (ref 70–130)
GLUCOSE BLDC GLUCOMTR-MCNC: 246 MG/DL (ref 70–130)
GLUCOSE SERPL-MCNC: 178 MG/DL (ref 65–99)
HCT VFR BLD AUTO: 46.4 % (ref 37.5–51)
HGB BLD-MCNC: 15.3 G/DL (ref 13–17.7)
LDH SERPL-CCNC: 368 U/L (ref 135–225)
LYMPHOCYTES # BLD MANUAL: 0.41 10*3/MM3 (ref 0.7–3.1)
LYMPHOCYTES NFR BLD MANUAL: 2 % (ref 19.6–45.3)
LYMPHOCYTES NFR BLD MANUAL: 8.1 % (ref 5–12)
MCH RBC QN AUTO: 29.7 PG (ref 26.6–33)
MCHC RBC AUTO-ENTMCNC: 33 G/DL (ref 31.5–35.7)
MCV RBC AUTO: 89.9 FL (ref 79–97)
METAMYELOCYTES NFR BLD MANUAL: 1 % (ref 0–0)
MONOCYTES # BLD AUTO: 1.65 10*3/MM3 (ref 0.1–0.9)
MYELOCYTES NFR BLD MANUAL: 2 % (ref 0–0)
NEUTROPHILS # BLD AUTO: 17.7 10*3/MM3 (ref 1.7–7)
NEUTROPHILS NFR BLD MANUAL: 86.9 % (ref 42.7–76)
NRBC BLD AUTO-RTO: 0.2 /100 WBC (ref 0–0.2)
PLAT MORPH BLD: NORMAL
PLATELET # BLD AUTO: 485 10*3/MM3 (ref 140–450)
PMV BLD AUTO: 9.4 FL (ref 6–12)
POTASSIUM SERPL-SCNC: 4.5 MMOL/L (ref 3.5–5.2)
PROCALCITONIN SERPL-MCNC: 0.04 NG/ML (ref 0–0.25)
PROT SERPL-MCNC: 6.3 G/DL (ref 6–8.5)
RBC # BLD AUTO: 5.16 10*6/MM3 (ref 4.14–5.8)
RBC MORPH BLD: NORMAL
SODIUM SERPL-SCNC: 137 MMOL/L (ref 136–145)
WBC # BLD AUTO: 20.37 10*3/MM3 (ref 3.4–10.8)
WBC MORPH BLD: NORMAL

## 2021-11-13 PROCEDURE — 85379 FIBRIN DEGRADATION QUANT: CPT | Performed by: NURSE PRACTITIONER

## 2021-11-13 PROCEDURE — 84145 PROCALCITONIN (PCT): CPT | Performed by: NURSE PRACTITIONER

## 2021-11-13 PROCEDURE — 85025 COMPLETE CBC W/AUTO DIFF WBC: CPT | Performed by: INTERNAL MEDICINE

## 2021-11-13 PROCEDURE — 94799 UNLISTED PULMONARY SVC/PX: CPT

## 2021-11-13 PROCEDURE — 63710000001 DEXAMETHASONE PER 0.25 MG: Performed by: NURSE PRACTITIONER

## 2021-11-13 PROCEDURE — 83615 LACTATE (LD) (LDH) ENZYME: CPT | Performed by: NURSE PRACTITIONER

## 2021-11-13 PROCEDURE — 80053 COMPREHEN METABOLIC PANEL: CPT | Performed by: INTERNAL MEDICINE

## 2021-11-13 PROCEDURE — 82728 ASSAY OF FERRITIN: CPT | Performed by: NURSE PRACTITIONER

## 2021-11-13 PROCEDURE — 85007 BL SMEAR W/DIFF WBC COUNT: CPT | Performed by: INTERNAL MEDICINE

## 2021-11-13 PROCEDURE — 63710000001 INSULIN LISPRO (HUMAN) PER 5 UNITS: Performed by: NURSE PRACTITIONER

## 2021-11-13 PROCEDURE — 86140 C-REACTIVE PROTEIN: CPT | Performed by: INTERNAL MEDICINE

## 2021-11-13 PROCEDURE — 82962 GLUCOSE BLOOD TEST: CPT

## 2021-11-13 PROCEDURE — 25010000002 ENOXAPARIN PER 10 MG: Performed by: NURSE PRACTITIONER

## 2021-11-13 RX ORDER — ECHINACEA PURPUREA EXTRACT 125 MG
2 TABLET ORAL AS NEEDED
Status: DISCONTINUED | OUTPATIENT
Start: 2021-11-13 | End: 2021-11-16 | Stop reason: HOSPADM

## 2021-11-13 RX ADMIN — ATORVASTATIN CALCIUM 20 MG: 20 TABLET, FILM COATED ORAL at 20:48

## 2021-11-13 RX ADMIN — GABAPENTIN 100 MG: 100 CAPSULE ORAL at 20:48

## 2021-11-13 RX ADMIN — DEXAMETHASONE 6 MG: 4 TABLET ORAL at 08:10

## 2021-11-13 RX ADMIN — GABAPENTIN 100 MG: 100 CAPSULE ORAL at 16:08

## 2021-11-13 RX ADMIN — DEXAMETHASONE 6 MG: 4 TABLET ORAL at 20:47

## 2021-11-13 RX ADMIN — GABAPENTIN 100 MG: 100 CAPSULE ORAL at 08:11

## 2021-11-13 RX ADMIN — ENOXAPARIN SODIUM 40 MG: 40 INJECTION SUBCUTANEOUS at 16:08

## 2021-11-13 RX ADMIN — PANTOPRAZOLE SODIUM 40 MG: 40 TABLET, DELAYED RELEASE ORAL at 05:50

## 2021-11-13 RX ADMIN — INSULIN LISPRO 2 UNITS: 100 INJECTION, SOLUTION INTRAVENOUS; SUBCUTANEOUS at 12:08

## 2021-11-13 RX ADMIN — INSULIN LISPRO 2 UNITS: 100 INJECTION, SOLUTION INTRAVENOUS; SUBCUTANEOUS at 08:10

## 2021-11-13 RX ADMIN — BUDESONIDE AND FORMOTEROL FUMARATE DIHYDRATE 2 PUFF: 160; 4.5 AEROSOL RESPIRATORY (INHALATION) at 20:16

## 2021-11-13 RX ADMIN — BUDESONIDE AND FORMOTEROL FUMARATE DIHYDRATE 2 PUFF: 160; 4.5 AEROSOL RESPIRATORY (INHALATION) at 12:09

## 2021-11-13 RX ADMIN — BARICITINIB 4 MG: 2 TABLET, FILM COATED ORAL at 16:08

## 2021-11-13 RX ADMIN — INSULIN LISPRO 4 UNITS: 100 INJECTION, SOLUTION INTRAVENOUS; SUBCUTANEOUS at 20:48

## 2021-11-13 RX ADMIN — INSULIN LISPRO 2 UNITS: 100 INJECTION, SOLUTION INTRAVENOUS; SUBCUTANEOUS at 17:32

## 2021-11-13 NOTE — PLAN OF CARE
Goal Outcome Evaluation:  Plan of Care Reviewed With: patient        Progress: improving  Outcome Summary: Patient continues on 10 L HHFNC with SATs in mid 90s, SR to SB on monitor, A&O x 4, up ad giles. Patient rested well throughout the shift. Will continue to monitor.

## 2021-11-13 NOTE — PROGRESS NOTES
" LOS: 5 days     Name: Flo Willoughby  Age: 41 y.o.  Sex: male  :  1980  MRN: [id number]         Primary Care Physician: Provider, No Known    Subjective   Subjective  No new complaints or overnight events.  Weaned down to 7.5 L of high flow oxygen this morning.  Overall feels better.    Objective   Vital Signs  Temp:  [96 °F (35.6 °C)-97.6 °F (36.4 °C)] 97.6 °F (36.4 °C)  Heart Rate:  [61-64] 64  Resp:  [16-20] 20  BP: (110-135)/(76-86) 110/76  Body mass index is 37.12 kg/m².    Objective:  General Appearance:  Comfortable and in no acute distress.    Vital signs: (most recent): Blood pressure 110/76, pulse 64, temperature 97.6 °F (36.4 °C), temperature source Oral, resp. rate 20, height 167.6 cm (66\"), weight 104 kg (230 lb), SpO2 92 %.    Lungs:  Normal effort and normal respiratory rate.    Heart: Normal rate.  Regular rhythm.    Abdomen: Abdomen is soft.  Bowel sounds are normal.   There is no abdominal tenderness.     Extremities: There is no dependent edema or local swelling.    Neurological: Patient is alert and oriented to person, place and time.    Skin:  Warm and dry.              Results Review:       I reviewed the patient's new clinical results.    Results from last 7 days   Lab Units 11/13/21  0431 11/12/21  0513 11/11/21  0249 11/10/21  0500 21  0612 21  0910   WBC 10*3/mm3 20.37* 20.24* 19.41* 14.32* 10.75 15.99*   HEMOGLOBIN g/dL 15.3 15.0 14.5 14.6 15.0 15.6   PLATELETS 10*3/mm3 485* 467* 415 360 324 329     Results from last 7 days   Lab Units 11/13/21  0431 11/12/21  0513 11/11/21  0249 11/10/21  0500 21  0612 21  1431 21  0910   SODIUM mmol/L 137 139 140 140 139  --  143   POTASSIUM mmol/L 4.5 4.7 4.5 4.0 4.2  --  3.6   CHLORIDE mmol/L 102 105 106 106 103  --  104   CO2 mmol/L 25.8 25.5 24.3 23.3 24.1  --  23.5   BUN mg/dL 16 16 15 14 14  --  12   CREATININE mg/dL 0.80 0.87 0.71* 0.55* 0.59* 0.81 0.67*   CALCIUM mg/dL 8.6 8.8 8.7 8.9 8.7  --  8.7   GLUCOSE " mg/dL 178* 165* 173* 189* 192*  --  140*                 Scheduled Meds:   atorvastatin, 20 mg, Oral, Nightly  baricitinib, 4 mg, Oral, Daily  budesonide-formoterol, 2 puff, Inhalation, BID - RT  dexamethasone, 6 mg, Oral, Q12H  enoxaparin, 40 mg, Subcutaneous, Q24H  gabapentin, 100 mg, Oral, TID  insulin lispro, 0-9 Units, Subcutaneous, 4x Daily With Meals & Nightly  metoprolol tartrate, 50 mg, Oral, Q12H  pantoprazole, 40 mg, Oral, Q AM      PRN Meds:   •  acetaminophen  •  cyclobenzaprine  •  dextrose  •  dextrose  •  glucagon (human recombinant)  •  melatonin  •  Morphine  •  nitroglycerin  •  ondansetron  •  Pharmacy to Dose Baricitinib (COVID-19)  Continuous Infusions:  Pharmacy to Dose Baricitinib (COVID-19),         Assessment/Plan   Active Hospital Problems    Diagnosis  POA   • **Pneumonia due to COVID-19 virus [U07.1, J12.82]  Yes   • Cytokine release syndrome, grade unspecified [D89.839]  Yes   • Prediabetes [R73.03]  Yes   • Acute respiratory failure with hypoxia (HCC) [J96.01]  Yes   • HTN (hypertension) [I10]  Yes   • Former smoker [Z87.891]  Not Applicable   • Class 2 obesity in adult [E66.9]  Yes      Resolved Hospital Problems   No resolved problems to display.       Assessment & Plan    -Continue Decadron, day 6.  Has completed remdesivir.  Baricitinib day 5.  CRP downtrending.    Oxygenation slowly improving.  On 7-8 L high flow at this point  -Pulmonology checking alpha-1 antitrypsin  -Blood sugars reasonably controlled and will continue current regimen  -Lovenox for DVT prophylaxis         I wore full protective equipment throughout the patient encounter including eye protection and facemask.  Hand hygiene was performed before donning protective equipment and after removal when leaving the room.    Juan José Brizuela MD  [facility]  11/13/21  10:39 EST

## 2021-11-13 NOTE — PROGRESS NOTES
[facility] INPATIENT PROGRESS NOTE         [address]    2021      PATIENT IDENTIFICATION:  Name: Flo Willoughby ADMIT: 2021   : 1980  PCP: Provider, No Known    MRN: [id number] LOS: 5 days   AGE/SEX: 41 y.o. male  ROOM: [facility]                     LOS 5    Reason for visit: COVID-19 pneumonia with respiratory failure      SUBJECTIVE:      On 8 L high flow nasal cannula oxygen.  Saturation is 96% no productive cough or chest pain.  Diminished breath sounds bilaterally.  No wheezing or rhonchi.  No Nausea or vomiting.   I am seeing the patient for the first time today.  All patient problems are new to me.      Objective   OBJECTIVE:    Vital Sign Min/Max for last 24 hours  Temp  Min: 96.1 °F (35.6 °C)  Max: 97.6 °F (36.4 °C)   BP  Min: 110/76  Max: 135/86   Pulse  Min: 48  Max: 95   Resp  Min: 16  Max: 20   SpO2  Min: 92 %  Max: 97 %   No data recorded   No data recorded                         Body mass index is 37.12 kg/m².    Intake/Output Summary (Last 24 hours) at 2021 1332  Last data filed at 2021 1251  Gross per 24 hour   Intake --   Output 2050 ml   Net -2050 ml         Exam:  GEN:  No distress, appears stated age  EYES:   PERRL, anicteric sclerae  ENT:    External ears/nose normal, OP clear  NECK:  No adenopathy, midline trachea  LUNGS: Normal chest on inspection, palpation and diminished bilaterally on auscultation  CV:  Normal S1S2, without murmur  ABD:  Nontender, nondistended, no hepatosplenomegaly, +BS  EXT:  No edema.  No cyanosis or clubbing.  No mottling and normal cap refill.    Assessment     Scheduled meds:  atorvastatin, 20 mg, Oral, Nightly  baricitinib, 4 mg, Oral, Daily  budesonide-formoterol, 2 puff, Inhalation, BID - RT  dexamethasone, 6 mg, Oral, Q12H  enoxaparin, 40 mg, Subcutaneous, Q24H  gabapentin, 100 mg, Oral, TID  insulin lispro, 0-9 Units, Subcutaneous, 4x Daily With Meals & Nightly  metoprolol tartrate, 50 mg, Oral, Q12H  pantoprazole,  40 mg, Oral, Q AM      IV meds:                      Pharmacy to Dose Baricitinib (COVID-19),       Data Review:  Results from last 7 days   Lab Units 11/13/21  0431 11/12/21  0513 11/12/21  0513 11/11/21  0249 11/11/21  0249 11/10/21  0500 11/10/21  0500 11/09/21  0612 11/09/21  0612   SODIUM mmol/L 137  --  139  --  140  --  140  --  139   POTASSIUM mmol/L 4.5  --  4.7  --  4.5  --  4.0  --  4.2   CHLORIDE mmol/L 102  --  105  --  106  --  106  --  103   CO2 mmol/L 25.8  --  25.5  --  24.3  --  23.3  --  24.1   BUN mg/dL 16  --  16  --  15  --  14  --  14   CREATININE mg/dL 0.80  --  0.87  --  0.71*  --  0.55*  --  0.59*   GLUCOSE mg/dL 178*   < > 165*   < > 173*   < > 189*   < > 192*   CALCIUM mg/dL 8.6  --  8.8  --  8.7  --  8.9  --  8.7    < > = values in this interval not displayed.         Estimated Creatinine Clearance: 135.9 mL/min (by C-G formula based on SCr of 0.8 mg/dL).  Results from last 7 days   Lab Units 11/13/21  0431 11/12/21  0513 11/11/21  0249 11/10/21  0500 11/09/21  0612   WBC 10*3/mm3 20.37* 20.24* 19.41* 14.32* 10.75   HEMOGLOBIN g/dL 15.3 15.0 14.5 14.6 15.0   PLATELETS 10*3/mm3 485* 467* 415 360 324         Results from last 7 days   Lab Units 11/13/21  0431 11/12/21  0513 11/11/21  0249 11/10/21  0500 11/09/21  0612   ALT (SGPT) U/L 47* 40 35 31 31   AST (SGOT) U/L 18 16 22 24 32         Results from last 7 days   Lab Units 11/13/21 0431 11/11/21 0249 11/09/21  0612 11/08/21  1431 11/08/21  0910   PROCALCITONIN ng/mL 0.04 0.06 0.07 0.07 0.04   LACTATE mmol/L  --   --   --  1.7 2.5*         Glucose   Date/Time Value Ref Range Status   11/13/2021 1053 173 (H) 70 - 130 mg/dL Final     Comment:     Meter: [id number] : [id number] Barbie GUSMAN   11/13/2021 0553 164 (H) 70 - 130 mg/dL Final     Comment:     Meter: [id number] : [id number] Sriram GUSMAN   11/12/2021 1941 245 (H) 70 - 130 mg/dL Final     Comment:     Meter: [id number] : [id number] Sriram GUSMAN    11/12/2021 1537 194 (H) 70 - 130 mg/dL Final     Comment:     Meter: [id number] : [id number] Maame Moran MARK   11/12/2021 1047 174 (H) 70 - 130 mg/dL Final     Comment:     Meter: [id number] : [id number] Maame Moran MARK   11/12/2021 0550 143 (H) 70 - 130 mg/dL Final     Comment:     Meter: [id number] : [id number] Maxim Rea NA   11/11/2021 2228 214 (H) 70 - 130 mg/dL Final     Comment:     Meter: [id number] : [id number] Maxim Alcarazaia NA     CT chest 11/8 reviewed    Microbiology reviewed            Active Hospital Problems    Diagnosis  POA   • **Pneumonia due to COVID-19 virus [U07.1, J12.82]  Yes   • Cytokine release syndrome, grade unspecified [D89.839]  Yes   • Prediabetes [R73.03]  Yes   • Acute respiratory failure with hypoxia (HCC) [J96.01]  Yes   • HTN (hypertension) [I10]  Yes   • Former smoker [Z87.891]  Not Applicable   • Class 2 obesity in adult [E66.9]  Yes      Resolved Hospital Problems   No resolved problems to display.         ASSESSMENT:    COVID-19 pneumonia  COPD with emphysema  Acute hypoxemic respiratory failure  Obesity with a BMI 37.1 kg/m²  Steroid-induced leukocytosis      PLAN:    Continue steroid and baricitinib.  Status post completed remdesivir course.  Wean oxygen as able.  Encourage pulmonary toilet.        Abhinav Meyer MD  Pulmonary and Critical Care Medicine  [facility]  11/13/2021    13:32 EST

## 2021-11-13 NOTE — PLAN OF CARE
Goal Outcome Evaluation:              Outcome Summary: encouraged pt to sit up in chair, but hasn't yet today; reports using IS every hour; O2 at 8L HF NC

## 2021-11-14 LAB
ALBUMIN SERPL-MCNC: 3.6 G/DL (ref 3.5–5.2)
ALBUMIN/GLOB SERPL: 1.3 G/DL
ALP SERPL-CCNC: 62 U/L (ref 39–117)
ALT SERPL W P-5'-P-CCNC: 50 U/L (ref 1–41)
ANION GAP SERPL CALCULATED.3IONS-SCNC: 11.3 MMOL/L (ref 5–15)
AST SERPL-CCNC: 12 U/L (ref 1–40)
BILIRUB CONJ SERPL-MCNC: <0.2 MG/DL (ref 0–0.3)
BILIRUB SERPL-MCNC: 0.4 MG/DL (ref 0–1.2)
BUN SERPL-MCNC: 17 MG/DL (ref 6–20)
BUN/CREAT SERPL: 24.3 (ref 7–25)
CALCIUM SPEC-SCNC: 8.7 MG/DL (ref 8.6–10.5)
CHLORIDE SERPL-SCNC: 101 MMOL/L (ref 98–107)
CO2 SERPL-SCNC: 23.7 MMOL/L (ref 22–29)
CREAT SERPL-MCNC: 0.7 MG/DL (ref 0.76–1.27)
CRP SERPL-MCNC: 0.49 MG/DL (ref 0–0.5)
DEPRECATED RDW RBC AUTO: 42 FL (ref 37–54)
ERYTHROCYTE [DISTWIDTH] IN BLOOD BY AUTOMATED COUNT: 12.9 % (ref 12.3–15.4)
GFR SERPL CREATININE-BSD FRML MDRD: 124 ML/MIN/1.73
GLOBULIN UR ELPH-MCNC: 2.8 GM/DL
GLUCOSE BLDC GLUCOMTR-MCNC: 175 MG/DL (ref 70–130)
GLUCOSE BLDC GLUCOMTR-MCNC: 176 MG/DL (ref 70–130)
GLUCOSE BLDC GLUCOMTR-MCNC: 184 MG/DL (ref 70–130)
GLUCOSE BLDC GLUCOMTR-MCNC: 192 MG/DL (ref 70–130)
GLUCOSE SERPL-MCNC: 181 MG/DL (ref 65–99)
HCT VFR BLD AUTO: 49 % (ref 37.5–51)
HGB BLD-MCNC: 16.1 G/DL (ref 13–17.7)
LYMPHOCYTES # BLD MANUAL: 1.21 10*3/MM3 (ref 0.7–3.1)
LYMPHOCYTES NFR BLD MANUAL: 4 % (ref 5–12)
LYMPHOCYTES NFR BLD MANUAL: 6 % (ref 19.6–45.3)
MCH RBC QN AUTO: 29.4 PG (ref 26.6–33)
MCHC RBC AUTO-ENTMCNC: 32.9 G/DL (ref 31.5–35.7)
MCV RBC AUTO: 89.6 FL (ref 79–97)
MONOCYTES # BLD AUTO: 0.81 10*3/MM3 (ref 0.1–0.9)
NEUTROPHILS # BLD AUTO: 18.16 10*3/MM3 (ref 1.7–7)
NEUTROPHILS NFR BLD MANUAL: 90 % (ref 42.7–76)
PLAT MORPH BLD: NORMAL
PLATELET # BLD AUTO: 502 10*3/MM3 (ref 140–450)
PMV BLD AUTO: 9.6 FL (ref 6–12)
POTASSIUM SERPL-SCNC: 4.9 MMOL/L (ref 3.5–5.2)
PROT SERPL-MCNC: 6.4 G/DL (ref 6–8.5)
RBC # BLD AUTO: 5.47 10*6/MM3 (ref 4.14–5.8)
RBC MORPH BLD: NORMAL
SODIUM SERPL-SCNC: 136 MMOL/L (ref 136–145)
WBC # BLD AUTO: 20.18 10*3/MM3 (ref 3.4–10.8)
WBC MORPH BLD: NORMAL

## 2021-11-14 PROCEDURE — 63710000001 DEXAMETHASONE PER 0.25 MG: Performed by: INTERNAL MEDICINE

## 2021-11-14 PROCEDURE — 82248 BILIRUBIN DIRECT: CPT | Performed by: INTERNAL MEDICINE

## 2021-11-14 PROCEDURE — 94799 UNLISTED PULMONARY SVC/PX: CPT

## 2021-11-14 PROCEDURE — 80053 COMPREHEN METABOLIC PANEL: CPT | Performed by: INTERNAL MEDICINE

## 2021-11-14 PROCEDURE — 25010000002 ENOXAPARIN PER 10 MG: Performed by: NURSE PRACTITIONER

## 2021-11-14 PROCEDURE — 82962 GLUCOSE BLOOD TEST: CPT

## 2021-11-14 PROCEDURE — 63710000001 INSULIN LISPRO (HUMAN) PER 5 UNITS: Performed by: NURSE PRACTITIONER

## 2021-11-14 PROCEDURE — 85007 BL SMEAR W/DIFF WBC COUNT: CPT | Performed by: INTERNAL MEDICINE

## 2021-11-14 PROCEDURE — 86140 C-REACTIVE PROTEIN: CPT | Performed by: INTERNAL MEDICINE

## 2021-11-14 PROCEDURE — 85025 COMPLETE CBC W/AUTO DIFF WBC: CPT | Performed by: INTERNAL MEDICINE

## 2021-11-14 RX ADMIN — INSULIN LISPRO 2 UNITS: 100 INJECTION, SOLUTION INTRAVENOUS; SUBCUTANEOUS at 17:21

## 2021-11-14 RX ADMIN — BUDESONIDE AND FORMOTEROL FUMARATE DIHYDRATE 2 PUFF: 160; 4.5 AEROSOL RESPIRATORY (INHALATION) at 08:43

## 2021-11-14 RX ADMIN — BUDESONIDE AND FORMOTEROL FUMARATE DIHYDRATE 2 PUFF: 160; 4.5 AEROSOL RESPIRATORY (INHALATION) at 19:55

## 2021-11-14 RX ADMIN — INSULIN LISPRO 2 UNITS: 100 INJECTION, SOLUTION INTRAVENOUS; SUBCUTANEOUS at 20:19

## 2021-11-14 RX ADMIN — ENOXAPARIN SODIUM 40 MG: 40 INJECTION SUBCUTANEOUS at 14:25

## 2021-11-14 RX ADMIN — METOPROLOL TARTRATE 50 MG: 50 TABLET, FILM COATED ORAL at 08:33

## 2021-11-14 RX ADMIN — GABAPENTIN 100 MG: 100 CAPSULE ORAL at 20:19

## 2021-11-14 RX ADMIN — INSULIN LISPRO 2 UNITS: 100 INJECTION, SOLUTION INTRAVENOUS; SUBCUTANEOUS at 11:36

## 2021-11-14 RX ADMIN — GABAPENTIN 100 MG: 100 CAPSULE ORAL at 08:33

## 2021-11-14 RX ADMIN — PANTOPRAZOLE SODIUM 40 MG: 40 TABLET, DELAYED RELEASE ORAL at 05:02

## 2021-11-14 RX ADMIN — BARICITINIB 4 MG: 2 TABLET, FILM COATED ORAL at 14:25

## 2021-11-14 RX ADMIN — INSULIN LISPRO 2 UNITS: 100 INJECTION, SOLUTION INTRAVENOUS; SUBCUTANEOUS at 08:33

## 2021-11-14 RX ADMIN — GABAPENTIN 100 MG: 100 CAPSULE ORAL at 17:21

## 2021-11-14 RX ADMIN — SALINE NASAL SPRAY 2 SPRAY: 1.5 SOLUTION NASAL at 11:36

## 2021-11-14 RX ADMIN — ATORVASTATIN CALCIUM 20 MG: 20 TABLET, FILM COATED ORAL at 20:19

## 2021-11-14 RX ADMIN — DEXAMETHASONE 6 MG: 4 TABLET ORAL at 08:33

## 2021-11-14 NOTE — PLAN OF CARE
Goal Outcome Evaluation:  Plan of Care Reviewed With: patient        Progress: improving  Outcome Summary: Continuing to titrate patients O2, down from 8L to 6L with SATs in mid 90s. SR to SB on monitor. Completed remdesivir. Patient is alert and oriented x 4, ad giles. Patient slept well throuhout the night. Will continue to monitor.

## 2021-11-14 NOTE — PROGRESS NOTES
[facility] INPATIENT PROGRESS NOTE         [address]    2021      PATIENT IDENTIFICATION:  Name: Flo Willoughby ADMIT: 2021   : 1980  PCP: Provider, No Known    MRN: [id number] LOS: 6 days   AGE/SEX: 41 y.o. male  ROOM: [facility]                     LOS 6    Reason for visit: COVID-19 pneumonia with respiratory failure      SUBJECTIVE:      Denies new complaints.  No chest pain, worsening shortness of breath or productive cough.  On high flow nasal cannula oxygen.  Saturations 96%.  Diminished but no wheezing or rhonchi.      Objective   OBJECTIVE:    Vital Sign Min/Max for last 24 hours  Temp  Min: 96 °F (35.6 °C)  Max: 97.7 °F (36.5 °C)   BP  Min: 114/72  Max: 124/84   Pulse  Min: 48  Max: 115   Resp  Min: 20  Max: 20   SpO2  Min: 90 %  Max: 97 %   No data recorded   No data recorded                         Body mass index is 37.12 kg/m².    Intake/Output Summary (Last 24 hours) at 2021 0929  Last data filed at 2021 1950  Gross per 24 hour   Intake 1080 ml   Output 1750 ml   Net -670 ml         Exam:  GEN:  No distress, appears stated age  EYES:   PERRL, anicteric sclerae  ENT:    External ears/nose normal, OP clear  NECK:  No adenopathy, midline trachea  LUNGS: Normal chest on inspection, palpation and diminished bilaterally on auscultation  CV:  Normal S1S2, without murmur  ABD:  Nontender, nondistended, no hepatosplenomegaly, +BS  EXT:  No edema.  No cyanosis or clubbing.  No mottling and normal cap refill.    Assessment     Scheduled meds:  atorvastatin, 20 mg, Oral, Nightly  baricitinib, 4 mg, Oral, Daily  budesonide-formoterol, 2 puff, Inhalation, BID - RT  dexamethasone, 6 mg, Oral, Daily With Breakfast  enoxaparin, 40 mg, Subcutaneous, Q24H  gabapentin, 100 mg, Oral, TID  insulin lispro, 0-9 Units, Subcutaneous, 4x Daily With Meals & Nightly  metoprolol tartrate, 50 mg, Oral, Q12H  pantoprazole, 40 mg, Oral, Q AM      IV meds:                      Pharmacy to  Dose Baricitinib (COVID-19),       Data Review:  Results from last 7 days   Lab Units 11/14/21  0502 11/13/21  0431 11/13/21  0431 11/12/21  0513 11/12/21  0513 11/11/21  0249 11/11/21  0249 11/10/21  0500 11/10/21  0500   SODIUM mmol/L 136  --  137  --  139  --  140  --  140   POTASSIUM mmol/L 4.9  --  4.5  --  4.7  --  4.5  --  4.0   CHLORIDE mmol/L 101  --  102  --  105  --  106  --  106   CO2 mmol/L 23.7  --  25.8  --  25.5  --  24.3  --  23.3   BUN mg/dL 17  --  16  --  16  --  15  --  14   CREATININE mg/dL 0.70*  --  0.80  --  0.87  --  0.71*  --  0.55*   GLUCOSE mg/dL 181*   < > 178*   < > 165*   < > 173*   < > 189*   CALCIUM mg/dL 8.7  --  8.6  --  8.8  --  8.7  --  8.9    < > = values in this interval not displayed.         Estimated Creatinine Clearance: 155.4 mL/min (A) (by C-G formula based on SCr of 0.7 mg/dL (L)).  Results from last 7 days   Lab Units 11/14/21  0502 11/13/21  0431 11/12/21  0513 11/11/21  0249 11/10/21  0500   WBC 10*3/mm3 20.18* 20.37* 20.24* 19.41* 14.32*   HEMOGLOBIN g/dL 16.1 15.3 15.0 14.5 14.6   PLATELETS 10*3/mm3 502* 485* 467* 415 360         Results from last 7 days   Lab Units 11/14/21  0502 11/13/21  0431 11/12/21  0513 11/11/21  0249 11/10/21  0500   ALT (SGPT) U/L 50* 47* 40 35 31   AST (SGOT) U/L 12 18 16 22 24         Results from last 7 days   Lab Units 11/13/21 0431 11/11/21 0249 11/09/21  0612 11/08/21  1431 11/08/21  0910   PROCALCITONIN ng/mL 0.04 0.06 0.07 0.07 0.04   LACTATE mmol/L  --   --   --  1.7 2.5*         Glucose   Date/Time Value Ref Range Status   11/14/2021 0545 175 (H) 70 - 130 mg/dL Final     Comment:     Meter: [id number] : [id number] Patricia CUBA   11/13/2021 2022 246 (H) 70 - 130 mg/dL Final     Comment:     Meter: [id number] : [id number] Patricia Saldivar [facility]   11/13/2021 1601 171 (H) 70 - 130 mg/dL Final     Comment:     Meter: [id number] : [id number] Barbie GUSMAN   11/13/2021 1053 173 (H) 70 - 130 mg/dL Final      Comment:     Meter: [id number] : [id number] Barbie Lundy NA   11/13/2021 0553 164 (H) 70 - 130 mg/dL Final     Comment:     Meter: [id number] : [id number] Sriram Horton NA   11/12/2021 1941 245 (H) 70 - 130 mg/dL Final     Comment:     Meter: [id number] : [id number] Sriram Horton NA   11/12/2021 1537 194 (H) 70 - 130 mg/dL Final     Comment:     Meter: [id number] : [id number] Maame Moran MARK     CT chest 11/8 reviewed    Microbiology reviewed            Active Hospital Problems    Diagnosis  POA   • **Pneumonia due to COVID-19 virus [U07.1, J12.82]  Yes   • Cytokine release syndrome, grade unspecified [D89.839]  Yes   • Prediabetes [R73.03]  Yes   • Acute respiratory failure with hypoxia (HCC) [J96.01]  Yes   • HTN (hypertension) [I10]  Yes   • Former smoker [Z87.891]  Not Applicable   • Class 2 obesity in adult [E66.9]  Yes      Resolved Hospital Problems   No resolved problems to display.         ASSESSMENT:    COVID-19 pneumonia  COPD with emphysema  Acute hypoxemic respiratory failure  Obesity with a BMI 37.1 kg/m²  Steroid-induced leukocytosis      PLAN:    Continue steroid and baricitinib.  Status post completed remdesivir course.  Wean oxygen as able.  Encourage pulmonary toilet.        Abhinav Meyer MD  Pulmonary and Critical Care Medicine  [facility], [facility]  11/14/2021    09:29 EST

## 2021-11-14 NOTE — PLAN OF CARE
Problem: Adult Inpatient Plan of Care  Goal: Plan of Care Review  Outcome: Ongoing, Progressing  Flowsheets (Taken 11/14/2021 1602)  Progress: improving  Plan of Care Reviewed With: patient  Outcome Summary: VSS, tolerating O2 titration down to 2L NC with sats 90-93%. No c/o pain, no signs of distress. A&Ox4. Up ad giles. Continues working with IS. Monitoring respiratory status, titrating O2 as tolerated  Goal: Patient-Specific Goal (Individualized)  Outcome: Ongoing, Progressing  Goal: Absence of Hospital-Acquired Illness or Injury  Outcome: Ongoing, Progressing  Intervention: Identify and Manage Fall Risk  Recent Flowsheet Documentation  Taken 11/14/2021 1420 by Wilton Chan, RN  Safety Promotion/Fall Prevention:  • activity supervised  • assistive device/personal items within reach  • clutter free environment maintained  • lighting adjusted  • nonskid shoes/slippers when out of bed  • room organization consistent  • safety round/check completed  Taken 11/14/2021 1200 by Wilton Chan, RN  Safety Promotion/Fall Prevention:  • activity supervised  • assistive device/personal items within reach  • clutter free environment maintained  • nonskid shoes/slippers when out of bed  • room organization consistent  • safety round/check completed  Taken 11/14/2021 1000 by Wilton Chan, RN  Safety Promotion/Fall Prevention:  • activity supervised  • assistive device/personal items within reach  • clutter free environment maintained  • nonskid shoes/slippers when out of bed  • room organization consistent  • safety round/check completed  Taken 11/14/2021 0833 by Wilton Chan, RN  Safety Promotion/Fall Prevention:  • activity supervised  • nonskid shoes/slippers when out of bed  • lighting adjusted  • room organization consistent  • safety round/check completed  • clutter free environment maintained  • assistive device/personal items within reach  Intervention: Prevent Skin Injury  Recent  Flowsheet Documentation  Taken 11/14/2021 1420 by Wilton Chan RN  Body Position: position changed independently  Skin Protection:  • adhesive use limited  • transparent dressing maintained  • tubing/devices free from skin contact  Taken 11/14/2021 1200 by Wilton Chan RN  Body Position: position changed independently  Taken 11/14/2021 1000 by Wilton Chan RN  Body Position: position changed independently  Taken 11/14/2021 0833 by Wilton Chan RN  Body Position: position changed independently  Skin Protection:  • adhesive use limited  • transparent dressing maintained  • tubing/devices free from skin contact  Intervention: Prevent Infection  Recent Flowsheet Documentation  Taken 11/14/2021 1420 by Wilton Chan RN  Infection Prevention:  • visitors restricted/screened  • single patient room provided  • rest/sleep promoted  • personal protective equipment utilized  Taken 11/14/2021 1200 by Wilton Chan RN  Infection Prevention:  • visitors restricted/screened  • rest/sleep promoted  • single patient room provided  • personal protective equipment utilized  • hand hygiene promoted  Taken 11/14/2021 1000 by Wilton Chan RN  Infection Prevention:  • visitors restricted/screened  • personal protective equipment utilized  • rest/sleep promoted  • single patient room provided  • hand hygiene promoted  Taken 11/14/2021 0833 by Wilton Chan RN  Infection Prevention:  • rest/sleep promoted  • personal protective equipment utilized  • single patient room provided  • visitors restricted/screened  Goal: Optimal Comfort and Wellbeing  Outcome: Ongoing, Progressing  Intervention: Provide Person-Centered Care  Recent Flowsheet Documentation  Taken 11/14/2021 1420 by Wilton Chan RN  Trust Relationship/Rapport: care explained  Taken 11/14/2021 0833 by Wilton Chan RN  Trust Relationship/Rapport: care explained  Goal: Readiness for  Transition of Care  Outcome: Ongoing, Progressing     Problem: Hypertension Comorbidity  Goal: Blood Pressure in Desired Range  Outcome: Ongoing, Progressing  Intervention: Maintain Hypertension-Management Strategies  Recent Flowsheet Documentation  Taken 11/14/2021 1420 by Wilton Chan RN  Medication Review/Management: medications reviewed  Taken 11/14/2021 1200 by Wilton Chan RN  Medication Review/Management: medications reviewed  Taken 11/14/2021 1000 by Wilton Chan RN  Medication Review/Management: medications reviewed  Taken 11/14/2021 0833 by Wilton Chan RN  Medication Review/Management: medications reviewed     Problem: Chest Pain  Goal: Resolution of Chest Pain Symptoms  Outcome: Ongoing, Progressing     Problem: Nausea and Vomiting  Goal: Fluid and Electrolyte Balance  Outcome: Ongoing, Progressing  Intervention: Prevent and Manage Nausea and Vomiting  Recent Flowsheet Documentation  Taken 11/14/2021 1420 by Wilton Chan RN  Environmental Support: calm environment promoted  Taken 11/14/2021 0833 by Wilton Chan RN  Environmental Support: calm environment promoted     Problem: Gas Exchange Impaired  Goal: Optimal Gas Exchange  Outcome: Ongoing, Progressing  Intervention: Optimize Oxygenation and Ventilation  Recent Flowsheet Documentation  Taken 11/14/2021 1420 by Wilton Chan RN  Head of Bed (HOB): HOB elevated  Taken 11/14/2021 1200 by Wilton Chan RN  Head of Bed (HOB): HOB elevated  Taken 11/14/2021 1000 by Wilton Cahn RN  Head of Bed (HOB): HOB elevated  Taken 11/14/2021 0833 by Wilton Chan RN  Head of Bed (HOB): HOB elevated  Airway/Ventilation Management:  • airway patency maintained  • pulmonary hygiene promoted     Problem: Fluid Imbalance (Pneumonia)  Goal: Fluid Balance  Outcome: Ongoing, Progressing     Problem: Infection (Pneumonia)  Goal: Resolution of Infection Signs and Symptoms  Outcome:  Ongoing, Progressing  Intervention: Prevent Infection Progression  Recent Flowsheet Documentation  Taken 11/14/2021 1420 by Wilton Chan RN  Isolation Precautions:  • contact precautions maintained  • droplet precautions maintained  Taken 11/14/2021 1200 by Wilton Chan RN  Isolation Precautions:  • contact precautions maintained  • droplet precautions maintained  Taken 11/14/2021 1000 by Wilton Chan RN  Isolation Precautions:  • contact precautions maintained  • droplet precautions maintained  Taken 11/14/2021 0833 by Wilton Chan RN  Isolation Precautions:  • contact precautions maintained  • droplet precautions maintained     Problem: Respiratory Compromise (Pneumonia)  Goal: Effective Oxygenation and Ventilation  Outcome: Ongoing, Progressing  Intervention: Promote Airway Secretion Clearance  Recent Flowsheet Documentation  Taken 11/14/2021 1420 by Wilton Chan RN  Cough And Deep Breathing: done independently per patient  Taken 11/14/2021 0833 by Wilton Chan RN  Breathing Techniques/Airway Clearance:  • deep/controlled cough encouraged  • pursed-lip breathing encouraged  Cough And Deep Breathing: done independently per patient  Intervention: Optimize Oxygenation and Ventilation  Recent Flowsheet Documentation  Taken 11/14/2021 1420 by Wilton Chan RN  Head of Bed (HOB): HOB elevated  Taken 11/14/2021 1200 by Wilton Chan RN  Head of Bed (HOB): HOB elevated  Taken 11/14/2021 1000 by Wilton Chan RN  Head of Bed (HOB): HOB elevated  Taken 11/14/2021 0833 by Wilton Chan RN  Head of Bed (HOB): HOB elevated  Airway/Ventilation Management:  • airway patency maintained  • pulmonary hygiene promoted   Goal Outcome Evaluation:  Plan of Care Reviewed With: patient        Progress: improving  Outcome Summary: VSS, tolerating O2 titration down to 4L NC with sats 90-93%. No c/o pain, no signs of distress. A&Ox4. Up ad giles.  Continues working with IS. Monitoring respiratory status, titrating O2 as tolerated

## 2021-11-14 NOTE — PROGRESS NOTES
" LOS: 6 days     Name: Flo Willoughby  Age: 41 y.o.  Sex: male  :  1980  MRN: [id number]         Primary Care Physician: Provider, No Known    Subjective   Subjective  Still with some shortness of breath with ambulation.  Feels about the same as yesterday.  Minimal cough.  No fevers or chills.  Denies chest pain.    Objective   Vital Signs  Temp:  [96 °F (35.6 °C)-97.7 °F (36.5 °C)] 97.5 °F (36.4 °C)  Heart Rate:  [] 115  Resp:  [20] 20  BP: (114-124)/(66-84) 117/74  Body mass index is 37.12 kg/m².    Objective:  General Appearance:  Comfortable, in no acute distress and ill-appearing.    Vital signs: (most recent): Blood pressure 117/74, pulse 115, temperature 97.5 °F (36.4 °C), temperature source Oral, resp. rate 20, height 167.6 cm (66\"), weight 104 kg (230 lb), SpO2 93 %.    Lungs:  Normal effort and normal respiratory rate.    Heart: Normal rate.  Regular rhythm.    Abdomen: Abdomen is soft.  Bowel sounds are normal.   There is no abdominal tenderness.     Extremities: There is no dependent edema or local swelling.    Neurological: Patient is alert and oriented to person, place and time.    Skin:  Warm and dry.              Results Review:       I reviewed the patient's new clinical results.    Results from last 7 days   Lab Units 21  0502 21  04321  0513 219 11/10/21  0500 21  0612 21  0910   WBC 10*3/mm3 20.18* 20.37* 20.24* 19.41* 14.32* 10.75 15.99*   HEMOGLOBIN g/dL 16.1 15.3 15.0 14.5 14.6 15.0 15.6   PLATELETS 10*3/mm3 502* 485* 467* 415 360 324 329     Results from last 7 days   Lab Units 21  0502 21  0431 21  0513 21  0249 11/10/21  0500 21  0612 21  1431 21  0910 21  0910   SODIUM mmol/L 136 137 139 140 140 139  --   --  143   POTASSIUM mmol/L 4.9 4.5 4.7 4.5 4.0 4.2  --   --  3.6   CHLORIDE mmol/L 101 102 105 106 106 103  --   --  104   CO2 mmol/L 23.7 25.8 25.5 24.3 23.3 24.1  --   --  23.5   BUN " mg/dL 17 16 16 15 14 14  --   --  12   CREATININE mg/dL 0.70* 0.80 0.87 0.71* 0.55* 0.59* 0.81   < > 0.67*   CALCIUM mg/dL 8.7 8.6 8.8 8.7 8.9 8.7  --   --  8.7   GLUCOSE mg/dL 181* 178* 165* 173* 189* 192*  --   --  140*    < > = values in this interval not displayed.                 Scheduled Meds:   atorvastatin, 20 mg, Oral, Nightly  baricitinib, 4 mg, Oral, Daily  budesonide-formoterol, 2 puff, Inhalation, BID - RT  dexamethasone, 6 mg, Oral, Daily With Breakfast  enoxaparin, 40 mg, Subcutaneous, Q24H  gabapentin, 100 mg, Oral, TID  insulin lispro, 0-9 Units, Subcutaneous, 4x Daily With Meals & Nightly  metoprolol tartrate, 50 mg, Oral, Q12H  pantoprazole, 40 mg, Oral, Q AM      PRN Meds:   •  acetaminophen  •  cyclobenzaprine  •  dextrose  •  dextrose  •  glucagon (human recombinant)  •  melatonin  •  Morphine  •  nitroglycerin  •  ondansetron  •  Pharmacy to Dose Baricitinib (COVID-19)  •  sodium chloride  Continuous Infusions:  Pharmacy to Dose Baricitinib (COVID-19),         Assessment/Plan   Active Hospital Problems    Diagnosis  POA   • **Pneumonia due to COVID-19 virus [U07.1, J12.82]  Yes   • Cytokine release syndrome, grade unspecified [D89.839]  Yes   • Prediabetes [R73.03]  Yes   • Acute respiratory failure with hypoxia (HCC) [J96.01]  Yes   • HTN (hypertension) [I10]  Yes   • Former smoker [Z87.891]  Not Applicable   • Class 2 obesity in adult [E66.9]  Yes      Resolved Hospital Problems   No resolved problems to display.       Assessment & Plan    -Continue Decadron, day 7.  Has completed remdesivir.  Baricitinib day 5. Oxygenation slowly improving.    Now down to 5-6 L.  CRP now back within normal limits.  Will decrease Decadron to once daily.  -Pulmonology checking alpha-1 antitrypsin  -Blood sugars reasonably controlled and will continue current regimen  -Lovenox for DVT prophylaxis      I wore full protective equipment throughout the patient encounter including eye protection and facemask.   Hand hygiene was performed before donning protective equipment and after removal when leaving the room.    Juan José Brizuela MD  [facility]  11/14/21  09:41 EST

## 2021-11-15 LAB
A1AT PHENOTYP SERPL IFE: ABNORMAL
A1AT SERPL-MCNC: 240 MG/DL (ref 101–187)
ALBUMIN SERPL-MCNC: 3.6 G/DL (ref 3.5–5.2)
ALBUMIN/GLOB SERPL: 1.4 G/DL
ALP SERPL-CCNC: 62 U/L (ref 39–117)
ALT SERPL W P-5'-P-CCNC: 57 U/L (ref 1–41)
ANION GAP SERPL CALCULATED.3IONS-SCNC: 12.9 MMOL/L (ref 5–15)
AST SERPL-CCNC: 19 U/L (ref 1–40)
BILIRUB CONJ SERPL-MCNC: <0.2 MG/DL (ref 0–0.3)
BILIRUB SERPL-MCNC: 0.4 MG/DL (ref 0–1.2)
BUN SERPL-MCNC: 19 MG/DL (ref 6–20)
BUN/CREAT SERPL: 26 (ref 7–25)
CALCIUM SPEC-SCNC: 8.4 MG/DL (ref 8.6–10.5)
CHLORIDE SERPL-SCNC: 101 MMOL/L (ref 98–107)
CO2 SERPL-SCNC: 21.1 MMOL/L (ref 22–29)
CREAT SERPL-MCNC: 0.73 MG/DL (ref 0.76–1.27)
CRP SERPL-MCNC: <0.3 MG/DL (ref 0–0.5)
D DIMER PPP FEU-MCNC: 0.35 MCGFEU/ML (ref 0–0.49)
DEPRECATED RDW RBC AUTO: 41.1 FL (ref 37–54)
ERYTHROCYTE [DISTWIDTH] IN BLOOD BY AUTOMATED COUNT: 13 % (ref 12.3–15.4)
GFR SERPL CREATININE-BSD FRML MDRD: 118 ML/MIN/1.73
GLOBULIN UR ELPH-MCNC: 2.5 GM/DL
GLUCOSE BLDC GLUCOMTR-MCNC: 124 MG/DL (ref 70–130)
GLUCOSE BLDC GLUCOMTR-MCNC: 130 MG/DL (ref 70–130)
GLUCOSE BLDC GLUCOMTR-MCNC: 200 MG/DL (ref 70–130)
GLUCOSE BLDC GLUCOMTR-MCNC: 204 MG/DL (ref 70–130)
GLUCOSE SERPL-MCNC: 138 MG/DL (ref 65–99)
HCT VFR BLD AUTO: 49.3 % (ref 37.5–51)
HGB BLD-MCNC: 16.8 G/DL (ref 13–17.7)
LDH SERPL-CCNC: 406 U/L (ref 135–225)
LYMPHOCYTES # BLD MANUAL: 0.62 10*3/MM3 (ref 0.7–3.1)
LYMPHOCYTES NFR BLD MANUAL: 3 % (ref 19.6–45.3)
LYMPHOCYTES NFR BLD MANUAL: 6.1 % (ref 5–12)
MCH RBC QN AUTO: 29.9 PG (ref 26.6–33)
MCHC RBC AUTO-ENTMCNC: 34.1 G/DL (ref 31.5–35.7)
MCV RBC AUTO: 87.9 FL (ref 79–97)
MONOCYTES # BLD AUTO: 1.27 10*3/MM3 (ref 0.1–0.9)
NEUTROPHILS # BLD AUTO: 18.85 10*3/MM3 (ref 1.7–7)
NEUTROPHILS NFR BLD MANUAL: 90.9 % (ref 42.7–76)
NRBC BLD AUTO-RTO: 0.1 /100 WBC (ref 0–0.2)
PLAT MORPH BLD: NORMAL
PLATELET # BLD AUTO: 476 10*3/MM3 (ref 140–450)
PMV BLD AUTO: 9.4 FL (ref 6–12)
POTASSIUM SERPL-SCNC: 5 MMOL/L (ref 3.5–5.2)
PROCALCITONIN SERPL-MCNC: 0.03 NG/ML (ref 0–0.25)
PROT SERPL-MCNC: 6.1 G/DL (ref 6–8.5)
RBC # BLD AUTO: 5.61 10*6/MM3 (ref 4.14–5.8)
RBC MORPH BLD: NORMAL
SODIUM SERPL-SCNC: 135 MMOL/L (ref 136–145)
WBC # BLD AUTO: 20.74 10*3/MM3 (ref 3.4–10.8)
WBC MORPH BLD: NORMAL

## 2021-11-15 PROCEDURE — 85025 COMPLETE CBC W/AUTO DIFF WBC: CPT | Performed by: INTERNAL MEDICINE

## 2021-11-15 PROCEDURE — 25010000002 ENOXAPARIN PER 10 MG: Performed by: NURSE PRACTITIONER

## 2021-11-15 PROCEDURE — 84145 PROCALCITONIN (PCT): CPT | Performed by: NURSE PRACTITIONER

## 2021-11-15 PROCEDURE — 86140 C-REACTIVE PROTEIN: CPT | Performed by: INTERNAL MEDICINE

## 2021-11-15 PROCEDURE — 36415 COLL VENOUS BLD VENIPUNCTURE: CPT | Performed by: NURSE PRACTITIONER

## 2021-11-15 PROCEDURE — 63710000001 INSULIN LISPRO (HUMAN) PER 5 UNITS: Performed by: NURSE PRACTITIONER

## 2021-11-15 PROCEDURE — 94761 N-INVAS EAR/PLS OXIMETRY MLT: CPT

## 2021-11-15 PROCEDURE — 63710000001 DEXAMETHASONE PER 0.25 MG: Performed by: INTERNAL MEDICINE

## 2021-11-15 PROCEDURE — 82962 GLUCOSE BLOOD TEST: CPT

## 2021-11-15 PROCEDURE — 82248 BILIRUBIN DIRECT: CPT | Performed by: INTERNAL MEDICINE

## 2021-11-15 PROCEDURE — 94799 UNLISTED PULMONARY SVC/PX: CPT

## 2021-11-15 PROCEDURE — 83615 LACTATE (LD) (LDH) ENZYME: CPT | Performed by: NURSE PRACTITIONER

## 2021-11-15 PROCEDURE — 85379 FIBRIN DEGRADATION QUANT: CPT | Performed by: NURSE PRACTITIONER

## 2021-11-15 PROCEDURE — 80053 COMPREHEN METABOLIC PANEL: CPT | Performed by: INTERNAL MEDICINE

## 2021-11-15 PROCEDURE — 85007 BL SMEAR W/DIFF WBC COUNT: CPT | Performed by: INTERNAL MEDICINE

## 2021-11-15 RX ADMIN — GABAPENTIN 100 MG: 100 CAPSULE ORAL at 20:35

## 2021-11-15 RX ADMIN — METOPROLOL TARTRATE 50 MG: 50 TABLET, FILM COATED ORAL at 10:06

## 2021-11-15 RX ADMIN — BUDESONIDE AND FORMOTEROL FUMARATE DIHYDRATE 2 PUFF: 160; 4.5 AEROSOL RESPIRATORY (INHALATION) at 09:03

## 2021-11-15 RX ADMIN — BUDESONIDE AND FORMOTEROL FUMARATE DIHYDRATE 2 PUFF: 160; 4.5 AEROSOL RESPIRATORY (INHALATION) at 19:54

## 2021-11-15 RX ADMIN — INSULIN LISPRO 4 UNITS: 100 INJECTION, SOLUTION INTRAVENOUS; SUBCUTANEOUS at 16:55

## 2021-11-15 RX ADMIN — INSULIN LISPRO 2 UNITS: 100 INJECTION, SOLUTION INTRAVENOUS; SUBCUTANEOUS at 21:38

## 2021-11-15 RX ADMIN — ATORVASTATIN CALCIUM 20 MG: 20 TABLET, FILM COATED ORAL at 20:35

## 2021-11-15 RX ADMIN — ENOXAPARIN SODIUM 40 MG: 40 INJECTION SUBCUTANEOUS at 14:17

## 2021-11-15 RX ADMIN — CYCLOBENZAPRINE 5 MG: 10 TABLET, FILM COATED ORAL at 20:43

## 2021-11-15 RX ADMIN — BARICITINIB 4 MG: 2 TABLET, FILM COATED ORAL at 14:17

## 2021-11-15 RX ADMIN — GABAPENTIN 100 MG: 100 CAPSULE ORAL at 16:56

## 2021-11-15 RX ADMIN — PANTOPRAZOLE SODIUM 40 MG: 40 TABLET, DELAYED RELEASE ORAL at 05:13

## 2021-11-15 RX ADMIN — METOPROLOL TARTRATE 50 MG: 50 TABLET, FILM COATED ORAL at 20:35

## 2021-11-15 RX ADMIN — DEXAMETHASONE 6 MG: 4 TABLET ORAL at 10:06

## 2021-11-15 NOTE — PROGRESS NOTES
Name: Flo Willoughby ADMIT: 2021   : 1980  PCP: Provider, No Known    MRN: [id number] LOS: 7 days   AGE/SEX: 41 y.o. male  ROOM: [facility]     Subjective   Subjective   Feeling okay today. No SOA or cough at rest. Gets winded when up in room. Tolerating diet. Voiding well.     Review of Systems   Constitutional: Negative for appetite change, chills, fatigue and fever.   HENT: Negative for ear pain, nosebleeds, sore throat, trouble swallowing and voice change.    Eyes: Negative for pain and visual disturbance.   Respiratory: Positive for shortness of breath (with exertion). Negative for cough, chest tightness and wheezing.    Cardiovascular: Negative for chest pain, palpitations and leg swelling.   Gastrointestinal: Negative for abdominal pain, blood in stool, diarrhea, nausea and vomiting.   Endocrine: Negative for cold intolerance and heat intolerance.   Genitourinary: Negative for decreased urine volume, difficulty urinating, dysuria and hematuria.   Musculoskeletal: Negative for back pain and neck pain.   Skin: Negative for color change, pallor and rash.   Allergic/Immunologic: Negative for environmental allergies and food allergies.   Neurological: Negative for seizures, syncope, speech difficulty and light-headedness.   Hematological: Negative for adenopathy. Does not bruise/bleed easily.   Psychiatric/Behavioral: Negative for behavioral problems, confusion and hallucinations.        Objective   Objective   Vital Signs  Temp:  [95.5 °F (35.3 °C)-97.4 °F (36.3 °C)] 95.5 °F (35.3 °C)  Heart Rate:  [] 65  Resp:  [16-20] 16  BP: (107-137)/(65-78) 137/77  SpO2:  [82 %-94 %] 91 %  on  Flow (L/min):  [2] 2;   Device (Oxygen Therapy): room air  Body mass index is 37.12 kg/m².  Physical Exam  Vitals and nursing note reviewed.   Constitutional:       General: He is not in acute distress.     Appearance: He is obese. He is not ill-appearing, toxic-appearing or diaphoretic.   HENT:      Head: Normocephalic  and atraumatic.      Right Ear: External ear normal.      Left Ear: External ear normal.      Nose: Nose normal.      Mouth/Throat:      Mouth: Mucous membranes are moist.      Pharynx: Oropharynx is clear.   Eyes:      General: No scleral icterus.        Right eye: No discharge.         Left eye: No discharge.      Extraocular Movements: Extraocular movements intact.      Conjunctiva/sclera: Conjunctivae normal.   Cardiovascular:      Rate and Rhythm: Normal rate and regular rhythm.      Pulses: Normal pulses.      Heart sounds: Normal heart sounds.   Pulmonary:      Effort: Pulmonary effort is normal. No respiratory distress.      Breath sounds: Normal breath sounds. No wheezing or rales.   Abdominal:      General: Bowel sounds are normal. There is no distension.      Palpations: Abdomen is soft.      Tenderness: There is no abdominal tenderness.   Musculoskeletal:         General: No swelling or deformity. Normal range of motion.      Cervical back: Neck supple. No rigidity.   Lymphadenopathy:      Cervical: No cervical adenopathy.   Skin:     General: Skin is warm and dry.      Capillary Refill: Capillary refill takes less than 2 seconds.      Coloration: Skin is not jaundiced.      Findings: No rash.   Neurological:      General: No focal deficit present.      Mental Status: He is alert and oriented to person, place, and time. Mental status is at baseline.      Cranial Nerves: No cranial nerve deficit.      Coordination: Coordination normal.   Psychiatric:         Mood and Affect: Mood normal.         Behavior: Behavior normal.         Thought Content: Thought content normal.         Results Review     I reviewed the patient's new clinical results.  Results from last 7 days   Lab Units 11/15/21  0324 11/14/21  0502 11/13/21  0431 11/12/21  0513   WBC 10*3/mm3 20.74* 20.18* 20.37* 20.24*   HEMOGLOBIN g/dL 16.8 16.1 15.3 15.0   PLATELETS 10*3/mm3 476* 502* 485* 467*     Results from last 7 days   Lab Units  11/15/21  0324 11/14/21  0502 11/13/21  0431 11/12/21  0513   SODIUM mmol/L 135* 136 137 139   POTASSIUM mmol/L 5.0 4.9 4.5 4.7   CHLORIDE mmol/L 101 101 102 105   CO2 mmol/L 21.1* 23.7 25.8 25.5   BUN mg/dL 19 17 16 16   CREATININE mg/dL 0.73* 0.70* 0.80 0.87   GLUCOSE mg/dL 138* 181* 178* 165*   EGFR IF NONAFRICN AM mL/min/1.73 118 124 106 96     Results from last 7 days   Lab Units 11/15/21  0324 11/14/21  0502 11/13/21  0431 11/12/21  0513   ALBUMIN g/dL 3.60 3.60 3.60 3.60   BILIRUBIN mg/dL 0.4 0.4 0.4 0.3   ALK PHOS U/L 62 62 60 72   AST (SGOT) U/L 19 12 18 16   ALT (SGPT) U/L 57* 50* 47* 40     Results from last 7 days   Lab Units 11/15/21  0324 11/14/21  0502 11/13/21  0431 11/12/21  0513   CALCIUM mg/dL 8.4* 8.7 8.6 8.8   ALBUMIN g/dL 3.60 3.60 3.60 3.60     Results from last 7 days   Lab Units 11/15/21  0324 11/13/21  0431 11/11/21  0249 11/09/21  0612   PROCALCITONIN ng/mL 0.03 0.04 0.06 0.07     COVID19   Date Value Ref Range Status   11/08/2021 Detected (C) Not Detected - Ref. Range Final     Glucose   Date/Time Value Ref Range Status   11/15/2021 1616 204 (H) 70 - 130 mg/dL Final     Comment:     Meter: [id number] : [id number] Meño Madera NA   11/15/2021 1117 130 70 - 130 mg/dL Final     Comment:     Meter: [id number] : [id number] eMño Madera NA   11/15/2021 0616 124 70 - 130 mg/dL Final     Comment:     Meter: [id number] : [id number] Pantoja Afshin NA   11/14/2021 1944 184 (H) 70 - 130 mg/dL Final     Comment:     Meter: [id number] : [id number] Scarlett Pepe NA   11/14/2021 1558 176 (H) 70 - 130 mg/dL Final     Comment:     Meter: [id number] : [id number] Barbie Lundy NA   11/14/2021 1111 192 (H) 70 - 130 mg/dL Final     Comment:     Meter: [id number] : [id number] Barbie Lundy NA   11/14/2021 0545 175 (H) 70 - 130 mg/dL Final     Comment:     Meter: [id number] : [id number] Patricia Saldivar CNA       CT Angiogram Chest  Narrative: CT ANGIOGRAM CHEST WITH CONTRAST,  PULMONARY EMBOLISM PROTOCOL     HISTORY: 41-year-old male with history of  COVID, evaluate for pulmonary  embolism. Hypoxia.     TECHNIQUE: Spiral CT images were obtained from the lung apices to the  diaphragmatic domes following administration of intravenous contrast in  the angiographic phase. Coronal, sagittal and 3-D volume rendered  reformats were then obtained.     COMPARISON: None.      FINDINGS: Slight delay in obtaining images with decreased opacification  within the pulmonary arteries. This limits evaluation of segmental  arteries. There are no convincing evidence of pulmonary artery  thromboembolism. Thoracic aorta is normal without evidence of  dissection. No pleural or pericardial effusion is seen. Mildly enlarged  mediastinal and hilar lymphadenopathy is seen. Index subcarinal lymph  node measures up to 1.1 cm in short axis dimension. A right infrahilar  lymph node measures up to 9 mm in short axis dimension. A left hilar  lymph node measures up to 1.1 cm in short axis dimension. The central  airways are patent without endobronchial lesion. There are areas of both  peripheral and central ground glass as well as consolidative opacity in  a distribution consistent with COVID-19 pneumonia. Background  emphysematous changes are present.      Impression: Slight delay in obtaining images leading to reduced  opacification within the pulmonary arterial branches however there is no  convincing evidence of pulmonary artery thromboembolism. CT findings  suggestive of COVID-19 pneumonia. Mildly enlarged mediastinal and hilar  lymph nodes are favored to be reactive.     These findings were discussed with Dr. Lange by telephone at the time  of dictation.     Radiation dose reduction techniques were utilized, including automated  exposure control and exposure modulation based on body size.     This report was finalized on 11/9/2021 1:11 PM by Dr. Ramy Olivas M.D.       Scheduled Medications  atorvastatin, 20  "mg, Oral, Nightly  baricitinib, 4 mg, Oral, Daily  budesonide-formoterol, 2 puff, Inhalation, BID - RT  dexamethasone, 6 mg, Oral, Daily With Breakfast  enoxaparin, 40 mg, Subcutaneous, Q24H  gabapentin, 100 mg, Oral, TID  insulin lispro, 0-9 Units, Subcutaneous, 4x Daily With Meals & Nightly  metoprolol tartrate, 50 mg, Oral, Q12H  pantoprazole, 40 mg, Oral, Q AM    Infusions  Pharmacy to Dose Baricitinib (COVID-19),     Diet  Diet Regular; Cardiac, Consistent Carbohydrate       Assessment/Plan     Active Hospital Problems    Diagnosis  POA   • **Pneumonia due to COVID-19 virus [U07.1, J12.82]  Yes   • Cytokine release syndrome, grade unspecified [D89.839]  Yes   • Prediabetes [R73.03]  Yes   • Acute respiratory failure with hypoxia (HCC) [J96.01]  Yes   • HTN (hypertension) [I10]  Yes   • Former smoker [Z87.891]  Not Applicable   • Class 2 obesity in adult [E66.9]  Yes      Resolved Hospital Problems   No resolved problems to display.       41 y.o. male admitted with acute hypoxic respiratory failure due to COViD pneumonia.    -Continue steroid (Decadron), day 8 of 10.  Has completed course of remdesivir.  FLORENCIO-inhibitor (Baricitinib) day 6 of 14. Oxygenation slowly improving. O2 around 91-92% on 3L/min at present. Dropped to 84% on RA with ambulation, 88% at rest on RA. CRP now back within normal limits. Have decreased Decadron to once daily.  -Pulmonology checking alpha-1 antitrypsin as imaging concerning for COPD \"more extensive than I would expect for a 42yo\", continue Symbicort  -Blood sugars reasonably controlled with SSI while on steroids, will continue current regimen, not on DM meds at home.  -BPs acceptable on metoprolol, amlodipine on hold    During all interaction with pt proper PPE was utilized (gown, gloves, mask, goggles, and face shield) and was donned/doffed according to recommendations. Hands were cleaned before and after interaction.    Lovenox 40 mg SC daily for DVT prophylaxis.  Full " code.  Discussed with patient, nursing staff and CCP. D/w Dr. Irwin.  Anticipate discharge home with family in 1-2 days. Would like for him to be stable on RA prior to dc.      Enoc House MD  [facility]  11/15/21  16:55 EST

## 2021-11-15 NOTE — PLAN OF CARE
Goal Outcome Evaluation:  Plan of Care Reviewed With: patient        Progress: improving  Outcome Summary: Pt now on RA with SATs in the low to mid 90s. SR to SB on monitor. A&Ox4, ad giles. Patient continues to work with IS. Will continue to monitor.

## 2021-11-15 NOTE — PROGRESS NOTES
[facility]  [phone]  John Mcneil MD    Subjective:  LOS: 7    Chief Complaint: COVID-19 infection    Awake alert comfortable and sitting in a chair on low-flow oxygen.  Denies much cough or phlegm.  Eager to go home.  No underlying lung disease.  Ex-smoker, quit 1 year ago, but so far without consequences.    Objective   Vital Signs past 24hrs    Temp range: Temp (24hrs), Av.1 °F (35.6 °C), Min:95.5 °F (35.3 °C), Max:97.4 °F (36.3 °C)    BP range: BP: (107-137)/(65-78) 137/77  Pulse range: Heart Rate:  [] 65  Resp rate range: Resp:  [16-20] 16    Device (Oxygen Therapy): room airFlow (L/min):  [2] 2  Oxygen range:SpO2:  [82 %-94 %] 91 %      104 kg (230 lb); Body mass index is 37.12 kg/m².    Intake/Output Summary (Last 24 hours) at 11/15/2021 1715  Last data filed at 11/15/2021 1300  Gross per 24 hour   Intake 1020 ml   Output --   Net 1020 ml       Physical Exam  Constitutional:       Appearance: He is obese.   HENT:      Mouth/Throat:      Mouth: Mucous membranes are moist.   Eyes:      Pupils: Pupils are equal, round, and reactive to light.   Cardiovascular:      Rate and Rhythm: Normal rate and regular rhythm.      Heart sounds: No murmur heard.      Pulmonary:      Effort: Pulmonary effort is normal.      Breath sounds: Rales (minimal bibasilar) present.   Abdominal:      General: Bowel sounds are normal.      Palpations: Abdomen is soft. There is no mass.      Tenderness: There is no abdominal tenderness.   Musculoskeletal:         General: No swelling.   Neurological:      General: No focal deficit present.      Mental Status: He is alert.       Results Review:    I have reviewed the laboratory and imaging data since the last note by LPC physician.  My annotations are noted in assessment and plan.    Results from last 7 days   Lab Units 11/15/21  0324 21  0502 21  0431 21  0513 21  0513 21  0249 21  0249   PROCALCITONIN ng/mL 0.03  --  0.04   --   --   --  0.06   FERRITIN ng/mL  --   --  1,214.00*  --  1,296.00*  --  1,647.00*   D DIMER QUANT MCGFEU/mL 0.35  --  <0.27  --   --   --  0.29   CRP mg/dL <0.30 0.49 0.90*   < > 1.75*   < > 3.89*    < > = values in this interval not displayed.       Medication Review:  I have reviewed the current MAR.  My annotations are noted in assessment and plan.    Pharmacy to Dose Baricitinib (COVID-19),       Plan   PCCM Problems  Acute hypoxic respiratory failure  Bilateral pulmonary infiltrates  SARS-CoV-2 infection  Ex-smoker  Obesity      THESE ARE NEW MEDICAL PROBLEMS TO ME.    Plan of Treatment    Patient is doing quite well.  On steroids and baricitinib.  Completed remdesivir previously.  Finish out course for both.    Remains on low-flow oxygen.  Hopefully can wean off completely.    CT chest consistent with COVID-19 infection.  Recommend follow-up imaging in 2 months with CT chest as chest x-ray was apparently clear.    Patient is an ex-smoker but so far no evidence COPD.    Has risk factors for sleep apnea recommend outpatient sleep evaluation.    If desired patient can be discharged home with supplemental oxygen, at most I expect he will need this for another week or 2.    John Mcneil MD  11/15/21  17:15 EST    While in the room and during my examination of the patient I wore gloves, gown, mask, eye protection and followed enhanced droplet/contact isolation protocol and precautions.  I washed my hands before and after this patient encounter.    Part of this note may be an electronic transcription/translation of spoken language to printed text using the Dragon Dictation System.

## 2021-11-15 NOTE — PAYOR COMM NOTE
"Flo Willoughby (41 y.o. Male)     PLEASE SEE ATTACHED FOR INPT AUTH.     REF#[id number]    PLEASE CALL  OR  [phone]    THANK YOU    DIRK WALKER LPN CCP            Date of Birth Social Security Number Address Home Phone MRN    1980  [address] [facility] [address] [phone] [id number]    Catholic Marital Status             None Single       Admission Date Admission Type Admitting Provider Attending Provider Department, Room/Bed    11/8/21 Emergency Rachel Fuentes MD Benton, John B, MD [address], [address]    Discharge Date Discharge Disposition Discharge Destination                         Attending Provider: Enoc House MD    Allergies: Azithromycin    Isolation: Enh Drop/Con   Infection: COVID (confirmed) (11/08/21)   Code Status: CPR   Advance Care Planning Activity    Ht: 167.6 cm (66\")   Wt: 104 kg (230 lb)    Admission Cmt: None   Principal Problem: Pneumonia due to COVID-19 virus [U07.1,J12.82]                 Active Insurance as of 11/8/2021     Primary Coverage     Payor Plan Insurance Group Employer/Plan Group    [employer] [facility] [employer] PPO [id number]     Payor Plan Address Payor Plan Phone Number Payor Plan Fax Number Effective Dates    [address] [phone]  1/1/2021 - None Entered    [facility] [id number]       Subscriber Name Subscriber Birth Date Member ID       FLO WILLOUGHBY 1980 [id number]                 Emergency Contacts      (Rel.) Home Phone Work Phone Mobile Phone    Mary Jane Cuevas (Significant Other) -- -- [phone]              Oxygen Therapy (last 3 days)     Date/Time SpO2 Device (Oxygen Therapy) Flow (L/min) Oxygen Concentration (%) ETCO2 (mmHg)    11/15/21 0903 89 room air -- -- --    11/15/21 0700 94 room air -- -- --    11/15/21 0616 -- room air -- -- --    11/15/21 0400 -- room air -- -- --    11/15/21 0000 -- nasal cannula 2 -- --    11/14/21 2302 -- nasal cannula " -- -- --    11/14/21 2000 -- nasal cannula 2 -- --    11/14/21 1942 -- nasal cannula 2 -- --    11/14/21 1722 94 nasal cannula 2 -- --    11/14/21 1420 -- nasal cannula 4 -- --    11/14/21 1410 95 humidified; high-flow nasal cannula 4 -- --    11/14/21 0843 93 nasal cannula 4 -- --    11/14/21 0840 94 nasal cannula 4 -- --    11/14/21 0833 -- nasal cannula 4 -- --    11/14/21 0713 97 humidified; high-flow nasal cannula 6 -- --    11/14/21 0400 -- humidified; high-flow nasal cannula 6 -- --    11/14/21 0000 -- high-flow nasal cannula; humidified 6 -- --    11/13/21 2325 97 high-flow nasal cannula; humidified 8 -- --    11/13/21 2000 -- high-flow nasal cannula; humidified 8 -- --    11/13/21 1950 93 humidified; high-flow nasal cannula 8 -- --    11/13/21 1613 90 humidified; high-flow nasal cannula 8 -- --    11/13/21 1425 -- high-flow nasal cannula; humidified 8 -- --    11/13/21 1401 94 humidified; high-flow nasal cannula 8 -- --    11/13/21 1209 95 high-flow nasal cannula 8 -- --    11/13/21 1208 92 humidified; high-flow nasal cannula 8 -- --    11/13/21 0814 92 high-flow nasal cannula; humidified 8 -- --    11/13/21 0713 97 humidified; high-flow nasal cannula 10 -- --    11/13/21 0400 -- humidified; high-flow nasal cannula 10 -- --    11/13/21 0000 -- humidified; high-flow nasal cannula 10 -- --    11/12/21 2320 96 humidified; high-flow nasal cannula 10 -- --    11/12/21 2000 -- humidified; high-flow nasal cannula 10 -- --    11/12/21 1948 -- humidified; high-flow nasal cannula -- 10 --    11/12/21 1947 -- humidified; high-flow nasal cannula -- 10 --    11/12/21 1936 96 humidified; high-flow nasal cannula 10 -- --    11/12/21 1701 92 humidified 10 -- --    11/12/21 1324 -- humidified; high-flow nasal cannula 10 -- --    11/12/21 1318 93 high-flow nasal cannula; humidified 10 -- --    11/12/21 1300 93 high-flow nasal cannula; humidified -- -- --    11/12/21 0932 89 humidified; high-flow nasal cannula 12 -- --     11/12/21 0813 -- humidified; high-flow nasal cannula 11 -- --    11/12/21 0700 90 high-flow nasal cannula; humidified -- -- --    11/12/21 0030 -- humidified; high-flow nasal cannula 11 -- --        Intake & Output (last 3 days)       11/12 0701 11/13 0700 11/13 0701 11/14 0700 11/14 0701  11/15 0700 11/15 0701 11/16 0700    P.O. 240 1440 660 360    Total Intake(mL/kg) 240 (2.3) 1440 (13.8) 660 (6.3) 360 (3.5)    Urine (mL/kg/hr) 250 (0.1) 2850 (1.1) 1500 (0.6)     Stool   0     Total Output 250 2850 1500     Net -10 -1410 -840 +360            Urine Unmeasured Occurrence   2 x     Stool Unmeasured Occurrence   2 x          Lines, Drains & Airways     Active LDAs     Name Placement date Placement time Site Days    Peripheral IV 11/10/21 1331 Right Hand 11/10/21  1331  Hand  4                  Medication Administration Report for Flo Willoughby as of 11/15/21 1210   Legend:    Given Hold Not Given Due Canceled Entry Other Actions    Time Time (Time) Time  Time-Action       Discontinued     Completed     Future     MAR Hold     Linked           Medications 11/13/21 11/14/21 11/15/21    acetaminophen (TYLENOL) tablet 650 mg  Dose: 650 mg  Freq: Every 6 Hours PRN Route: PO  PRN Reason: Mild Pain   Start: 11/08/21 1916   Admin Instructions:   Do not exceed 4 grams of acetaminophen in a 24 hr period. Max dose of 2gm for AST/ALT greater than 120 units/L      If given for pain, use the following pain scale:   Mild Pain = Pain Score of 1-3, CPOT 1-2  Moderate Pain = Pain Score of 4-6, CPOT 3-4  Severe Pain = Pain Score of 7-10, CPOT 5-8          atorvastatin (LIPITOR) tablet 20 mg  Dose: 20 mg  Freq: Nightly Route: PO  Start: 11/08/21 2100   Admin Instructions:   Avoid grapefruit juice.       2048-Given             2019-Given             2100             baricitinib (OLUMIANT) tablet 4 mg  Dose: 4 mg  Freq: Daily Route: PO  Start: 11/09/21 1400   End: 11/23/21 1359       1608-Given             1425-Given             1400              budesonide-formoterol (SYMBICORT) 160-4.5 MCG/ACT inhaler 2 puff  Dose: 2 puff  Freq: 2 Times Daily - RT Route: IN  Start: 11/08/21 1523   Admin Instructions:    Shake well.  Rinse mouth after use, do not swallow water.  Send aerosols to pharmacy in ziplock bag for proper disposal.       1209-Given     2016-Given            0843-Given     1955-Given            0903-Given     2130            cyclobenzaprine (FLEXERIL) tablet 5 mg  Dose: 5 mg  Freq: Nightly PRN Route: PO  PRN Reason: Muscle Spasms  Start: 11/08/21 1903          dexamethasone (DECADRON) tablet 6 mg  Dose: 6 mg  Freq: Daily With Breakfast Route: PO  Start: 11/14/21 0800   Admin Instructions:   Take with food.        0833-Given             1006-Given             dextrose (D50W) (25 g/50 mL) IV injection 25 g  Dose: 25 g  Freq: Every 15 Minutes PRN Route: IV  PRN Reason: Low Blood Sugar  PRN Comment: Blood Sugar Less Than 70  Start: 11/09/21 1501   Admin Instructions:   Blood sugar less than 70; patient has IV access - Unresponsive, NPO or Unable To Safely Swallow          dextrose (GLUTOSE) oral gel 15 g  Dose: 15 g  Freq: Every 15 Minutes PRN Route: PO  PRN Reason: Low Blood Sugar  PRN Comment: Blood sugar less than 70  Start: 11/09/21 1501   Admin Instructions:   BS<70, Patient Alert, Is not NPO, Can safely swallow.          enoxaparin (LOVENOX) syringe 40 mg  Dose: 40 mg  Freq: Every 24 Hours Route: SC  Indications of Use: PROPHYLAXIS OF VENOUS THROMBOEMBOLISM  Start: 11/08/21 1321   Admin Instructions:   Give subcutaneous in abdomen only. Do not massage site after injection.       1608-Given             1425-Given             1321             gabapentin (NEURONTIN) capsule 100 mg  Dose: 100 mg  Freq: 3 Times Daily Route: PO  Start: 11/08/21 2100   Admin Instructions:          0811-Given     1608-Given     2048-Given           0833-Given     1721-Given     2019-Given           (1008)-Not Given     1600     2100           glucagon (human  recombinant) (GLUCAGEN DIAGNOSTIC) injection 1 mg  Dose: 1 mg  Freq: Every 15 Minutes PRN Route: SC  PRN Reason: Low Blood Sugar  PRN Comment: Blood Glucose Less Than 70  Start: 11/09/21 1501   Admin Instructions:   Blood Glucose Less Than 70 - Patient Without IV Access - Unresponsive, NPO or Unable To Safely Swallow          insulin lispro (ADMELOG) injection 0-9 Units  Dose: 0-9 Units  Freq: 4 Times Daily With Meals & Nightly Route: SC  Start: 11/09/21 1800   Admin Instructions:   Correction - Moderate Dose.  40-60 units/day total insulin dose or average weight, on oral agents    Blood glucose 150-199 mg/dL - 2 units  Blood glucose 200-249 mg/dL - 4 units  Blood glucose 250-299 mg/dL - 6 units  Blood glucose 300-349 mg/dL - 7 units  Blood glucose 350-400 mg/dL - 8 units  Blood glucose greater than 400 mg/dL - 9 units and call provider   Caution: Look alike/sound alike drug alert       0810-Given     1208-Given     1732-Given       2048-Given             0833-Given     1136-Given     1721-Given       2019-Given             (1008)-Not Given [C]     (1010)-Not Given [C]     1200       1800     2100            melatonin tablet 5 mg  Dose: 5 mg  Freq: Nightly PRN Route: PO  PRN Reason: Sleep  Start: 11/08/21 1916          metoprolol tartrate (LOPRESSOR) tablet 50 mg  Dose: 50 mg  Freq: Every 12 Hours Route: PO  Start: 11/08/21 2000   Admin Instructions:   Hold SBP for less 110       (1215)-Not Given     (2048)-Not Given            0833-Given     (2019)-Not Given            1006-Given     2000            morphine injection 2 mg  Dose: 2 mg  Freq: Every 4 Hours PRN Route: IV  PRN Reason: Severe Pain   Start: 11/08/21 1915   End: 11/15/21 1914   Admin Instructions:   If given for pain, use the following pain scale:  Mild Pain = Pain Score of 1-3, CPOT 1-2  Moderate Pain = Pain Score of 4-6, CPOT 3-4  Severe Pain = Pain Score of 7-10, CPOT 5-8          nitroglycerin (NITROSTAT) SL tablet 0.4 mg  Dose: 0.4 mg  Freq: Every 5  "Minutes PRN Route: SL  PRN Reason: Chest Pain  PRN Comment: Only if SBP Greater Than 100  Start: 11/08/21 1910   Admin Instructions:   If Pain Unrelieved After 3 Doses Notify MD          ondansetron (ZOFRAN) injection 4 mg  Dose: 4 mg  Freq: Every 6 Hours PRN Route: IV  PRN Reasons: Nausea,Vomiting  Start: 11/08/21 1915          pantoprazole (PROTONIX) EC tablet 40 mg  Dose: 40 mg  Freq: Every Early Morning Route: PO  Start: 11/11/21 0600   Admin Instructions:   Swallow whole; do not crush, split, or chew.       0550-Given             0502-Given             0513-Given             Pharmacy to Dose Baricitinib (COVID-19)  Freq: Continuous PRN Route: XX  PRN Reason: Consult  Indications of Use: INFECTION CAUSED BY 2019 NOVEL CORONAVIRUS  Start: 11/09/21 1246   End: 11/23/21 1245   Order specific questions:   Are you an infectious disease or pulmonary/critical care provider? (Provider must be one of these provider types to order this medication for COVID-19 treatment.) Yes  Have you contacted pharmacy to validate this product is in stock? Yes  Hospitalized with confirmed COVID-19 infection Yes  Receiving high-flow nasal cannula or non-invasive mechanical ventilation with rapidly progressive illness and evidence of systemic inflammation Yes  Exclusion: Must NOT meet any of the following to be considered for treatment None  Provider contact number (cell): [phone]  Verbal consent obtained. Patient informed of alternative therapies available. Medication is an approved drug being used under EUA for COVID-19 and patient was provided the \"Fact Sheet for Patients, Parents and Caregivers?” (SEE HYPERLINK BELOW.) Yes            sodium chloride nasal spray 2 spray  Dose: 2 spray  Freq: As Needed Route: EACH NARE  PRN Reason: Congestion  Start: 11/13/21 1632   Admin Instructions:           1136-Given             Completed Medications  Medications 11/13/21 11/14/21 11/15/21       acetaminophen (TYLENOL) tablet 1,000 mg  Dose: 1,000 " mg  Freq: Once Route: PO  Start: 11/08/21 0853   End: 11/08/21 0914   Admin Instructions:   Do not exceed 4 grams of acetaminophen in a 24 hr period. Max dose of 2gm for AST/ALT greater than 120 units/L      If given for pain, use the following pain scale:   Mild Pain = Pain Score of 1-3, CPOT 1-2  Moderate Pain = Pain Score of 4-6, CPOT 3-4  Severe Pain = Pain Score of 7-10, CPOT 5-8          dexamethasone (DECADRON) injection 6 mg  Dose: 6 mg  Freq: Once Route: IV  Start: 11/08/21 0853   End: 11/08/21 0916   Admin Instructions:   For IV administration.  May be pushed over a minimum of 1 minute.          iopamidol (ISOVUE-370) 76 % injection 100 mL  Dose: 100 mL  Freq: Once in Imaging Route: IV  Start: 11/08/21 1059   End: 11/08/21 1057          lactated ringers bolus 500 mL  Dose: 500 mL  Freq: Once Route: IV  Start: 11/08/21 0853   End: 11/08/21 1036          remdesivir 200 mg in sodium chloride 0.9 % 290 mL IVPB (powder vial)  Dose: 200 mg  Freq: Every 24 Hours Route: IV  Start: 11/08/21 1320   End: 11/08/21 1552   Admin Instructions:   Ensure all of the drug is administered. Flush line with 30mL NS after administration.   Order specific questions:   Days Since Symptom Onset: 7-10 Days           Followed by  remdesivir 100 mg in sodium chloride 0.9 % 270 mL IVPB (powder vial)  Dose: 100 mg  Freq: Every 24 Hours Route: IV  Start: 11/09/21 1319   End: 11/12/21 1323   Admin Instructions:   Ensure all of the drug is administered. Flush line with 30mL NS after administration.         Discontinued Medications  Medications 11/13/21 11/14/21 11/15/21       dexamethasone (DECADRON) tablet 6 mg  Dose: 6 mg  Freq: Every 12 Hours Scheduled Route: PO  Start: 11/08/21 1321   End: 11/14/21 0733   Admin Instructions:   Take with food.       0810-Given     2047-Given              pantoprazole (PROTONIX) injection 40 mg  Dose: 40 mg  Freq: Every Early Morning Route: IV  Indications of Use: STRESS ULCER PROPHYLAXIS  Start:  "21 0600   End: 11/10/21 1134   Admin Instructions:   Dilute with 10 mL of 0.9% NaCl and give IV push over 2 minutes.          Pharmacy Consult - Remdesivir  Freq: Continuous PRN Route: XX  PRN Reason: Consult  Start: 21 1318   End: 21 1330   Order specific questions:   Days Since Symptom Onset: 7-10 Days                          Physician Progress Notes (last 72 hours)      Juan José Brizuela MD at 21 0941           LOS: 6 days     Name: Flo Willoughby  Age: 41 y.o.  Sex: male  :  1980  MRN: [id number]         Primary Care Physician: Provider, No Known    Subjective   Subjective  Still with some shortness of breath with ambulation.  Feels about the same as yesterday.  Minimal cough.  No fevers or chills.  Denies chest pain.    Objective   Vital Signs  Temp:  [96 °F (35.6 °C)-97.7 °F (36.5 °C)] 97.5 °F (36.4 °C)  Heart Rate:  [] 115  Resp:  [20] 20  BP: (114-124)/(66-84) 117/74  Body mass index is 37.12 kg/m².    Objective:  General Appearance:  Comfortable, in no acute distress and ill-appearing.    Vital signs: (most recent): Blood pressure 117/74, pulse 115, temperature 97.5 °F (36.4 °C), temperature source Oral, resp. rate 20, height 167.6 cm (66\"), weight 104 kg (230 lb), SpO2 93 %.    Lungs:  Normal effort and normal respiratory rate.    Heart: Normal rate.  Regular rhythm.    Abdomen: Abdomen is soft.  Bowel sounds are normal.   There is no abdominal tenderness.     Extremities: There is no dependent edema or local swelling.    Neurological: Patient is alert and oriented to person, place and time.    Skin:  Warm and dry.              Results Review:       I reviewed the patient's new clinical results.    Results from last 7 days   Lab Units 21  0502 21  0431 21  0513 21  0249 11/10/21  0500 21  0612 21  0910   WBC 10*3/mm3 20.18* 20.37* 20.24* 19.41* 14.32* 10.75 15.99*   HEMOGLOBIN g/dL 16.1 15.3 15.0 14.5 14.6 15.0 15.6   PLATELETS " 10*3/mm3 502* 485* 467* 415 360 324 329     Results from last 7 days   Lab Units 11/14/21  0502 11/13/21  0431 11/12/21  0513 11/11/21  0249 11/10/21  0500 11/09/21  0612 11/08/21  1431 11/08/21  0910 11/08/21  0910   SODIUM mmol/L 136 137 139 140 140 139  --   --  143   POTASSIUM mmol/L 4.9 4.5 4.7 4.5 4.0 4.2  --   --  3.6   CHLORIDE mmol/L 101 102 105 106 106 103  --   --  104   CO2 mmol/L 23.7 25.8 25.5 24.3 23.3 24.1  --   --  23.5   BUN mg/dL 17 16 16 15 14 14  --   --  12   CREATININE mg/dL 0.70* 0.80 0.87 0.71* 0.55* 0.59* 0.81   < > 0.67*   CALCIUM mg/dL 8.7 8.6 8.8 8.7 8.9 8.7  --   --  8.7   GLUCOSE mg/dL 181* 178* 165* 173* 189* 192*  --   --  140*    < > = values in this interval not displayed.                 Scheduled Meds:   atorvastatin, 20 mg, Oral, Nightly  baricitinib, 4 mg, Oral, Daily  budesonide-formoterol, 2 puff, Inhalation, BID - RT  dexamethasone, 6 mg, Oral, Daily With Breakfast  enoxaparin, 40 mg, Subcutaneous, Q24H  gabapentin, 100 mg, Oral, TID  insulin lispro, 0-9 Units, Subcutaneous, 4x Daily With Meals & Nightly  metoprolol tartrate, 50 mg, Oral, Q12H  pantoprazole, 40 mg, Oral, Q AM      PRN Meds:   •  acetaminophen  •  cyclobenzaprine  •  dextrose  •  dextrose  •  glucagon (human recombinant)  •  melatonin  •  Morphine  •  nitroglycerin  •  ondansetron  •  Pharmacy to Dose Baricitinib (COVID-19)  •  sodium chloride  Continuous Infusions:  Pharmacy to Dose Baricitinib (COVID-19),         Assessment/Plan   Active Hospital Problems    Diagnosis  POA   • **Pneumonia due to COVID-19 virus [U07.1, J12.82]  Yes   • Cytokine release syndrome, grade unspecified [D89.839]  Yes   • Prediabetes [R73.03]  Yes   • Acute respiratory failure with hypoxia (HCC) [J96.01]  Yes   • HTN (hypertension) [I10]  Yes   • Former smoker [Z87.891]  Not Applicable   • Class 2 obesity in adult [E66.9]  Yes      Resolved Hospital Problems   No resolved problems to display.       Assessment & Plan    -Continue  Decadron, day 7.  Has completed remdesivir.  Baricitinib day 5. Oxygenation slowly improving.    Now down to 5-6 L.  CRP now back within normal limits.  Will decrease Decadron to once daily.  -Pulmonology checking alpha-1 antitrypsin  -Blood sugars reasonably controlled and will continue current regimen  -Lovenox for DVT prophylaxis      I wore full protective equipment throughout the patient encounter including eye protection and facemask.  Hand hygiene was performed before donning protective equipment and after removal when leaving the room.    Juan José Brizuela MD  [facility]  21  09:41 EST      Electronically signed by Juan José Brizuela MD at 21 0943     Abhinav Meyer MD at 21 0742              [facility] INPATIENT PROGRESS NOTE         [address]    2021      PATIENT IDENTIFICATION:  Name: Flo Willoughby ADMIT: 2021   : 1980  PCP: Provider, No Known    MRN: [id number] LOS: 6 days   AGE/SEX: 41 y.o. male  ROOM: [facility]                     LOS 6    Reason for visit: COVID-19 pneumonia with respiratory failure      SUBJECTIVE:      Denies new complaints.  No chest pain, worsening shortness of breath or productive cough.  On high flow nasal cannula oxygen.  Saturations 96%.  Diminished but no wheezing or rhonchi.      Objective   OBJECTIVE:    Vital Sign Min/Max for last 24 hours  Temp  Min: 96 °F (35.6 °C)  Max: 97.7 °F (36.5 °C)   BP  Min: 114/72  Max: 124/84   Pulse  Min: 48  Max: 115   Resp  Min: 20  Max: 20   SpO2  Min: 90 %  Max: 97 %   No data recorded   No data recorded                         Body mass index is 37.12 kg/m².    Intake/Output Summary (Last 24 hours) at 2021 0929  Last data filed at 2021 1950  Gross per 24 hour   Intake 1080 ml   Output 1750 ml   Net -670 ml         Exam:  GEN:  No distress, appears stated age  EYES:   PERRL, anicteric sclerae  ENT:    External ears/nose normal, OP  clear  NECK:  No adenopathy, midline trachea  LUNGS: Normal chest on inspection, palpation and diminished bilaterally on auscultation  CV:  Normal S1S2, without murmur  ABD:  Nontender, nondistended, no hepatosplenomegaly, +BS  EXT:  No edema.  No cyanosis or clubbing.  No mottling and normal cap refill.    Assessment     Scheduled meds:  atorvastatin, 20 mg, Oral, Nightly  baricitinib, 4 mg, Oral, Daily  budesonide-formoterol, 2 puff, Inhalation, BID - RT  dexamethasone, 6 mg, Oral, Daily With Breakfast  enoxaparin, 40 mg, Subcutaneous, Q24H  gabapentin, 100 mg, Oral, TID  insulin lispro, 0-9 Units, Subcutaneous, 4x Daily With Meals & Nightly  metoprolol tartrate, 50 mg, Oral, Q12H  pantoprazole, 40 mg, Oral, Q AM      IV meds:                      Pharmacy to Dose Baricitinib (COVID-19),       Data Review:  Results from last 7 days   Lab Units 11/14/21  0502 11/13/21  0431 11/13/21  0431 11/12/21  0513 11/12/21  0513 11/11/21  0249 11/11/21  0249 11/10/21  0500 11/10/21  0500   SODIUM mmol/L 136  --  137  --  139  --  140  --  140   POTASSIUM mmol/L 4.9  --  4.5  --  4.7  --  4.5  --  4.0   CHLORIDE mmol/L 101  --  102  --  105  --  106  --  106   CO2 mmol/L 23.7  --  25.8  --  25.5  --  24.3  --  23.3   BUN mg/dL 17  --  16  --  16  --  15  --  14   CREATININE mg/dL 0.70*  --  0.80  --  0.87  --  0.71*  --  0.55*   GLUCOSE mg/dL 181*   < > 178*   < > 165*   < > 173*   < > 189*   CALCIUM mg/dL 8.7  --  8.6  --  8.8  --  8.7  --  8.9    < > = values in this interval not displayed.         Estimated Creatinine Clearance: 155.4 mL/min (A) (by C-G formula based on SCr of 0.7 mg/dL (L)).  Results from last 7 days   Lab Units 11/14/21  0502 11/13/21  0431 11/12/21  0513 11/11/21  0249 11/10/21  0500   WBC 10*3/mm3 20.18* 20.37* 20.24* 19.41* 14.32*   HEMOGLOBIN g/dL 16.1 15.3 15.0 14.5 14.6   PLATELETS 10*3/mm3 502* 485* 467* 415 360         Results from last 7 days   Lab Units 11/14/21  0502 11/13/21  0431  11/12/21  0513 11/11/21  0249 11/10/21  0500   ALT (SGPT) U/L 50* 47* 40 35 31   AST (SGOT) U/L 12 18 16 22 24         Results from last 7 days   Lab Units 11/13/21  0431 11/11/21  0249 11/09/21  0612 11/08/21  1431 11/08/21  0910   PROCALCITONIN ng/mL 0.04 0.06 0.07 0.07 0.04   LACTATE mmol/L  --   --   --  1.7 2.5*         Glucose   Date/Time Value Ref Range Status   11/14/2021 0545 175 (H) 70 - 130 mg/dL Final     Comment:     Meter: [id number] : [id number] Patricia Saldivar CNA   11/13/2021 2022 246 (H) 70 - 130 mg/dL Final     Comment:     Meter: [id number] : [id number] Patricia Saldivar CNA   11/13/2021 1601 171 (H) 70 - 130 mg/dL Final     Comment:     Meter: [id number] : [id number] Barbie Piyush Nerisa NA   11/13/2021 1053 173 (H) 70 - 130 mg/dL Final     Comment:     Meter: [id number] : [id number] Barbie Piyush Nerisa NA   11/13/2021 0553 164 (H) 70 - 130 mg/dL Final     Comment:     Meter: [id number] : [id number] Bhatia Clifford NA   11/12/2021 1941 245 (H) 70 - 130 mg/dL Final     Comment:     Meter: [id number] : [id number] Bhatia Clifford NA   11/12/2021 1537 194 (H) 70 - 130 mg/dL Final     Comment:     Meter: [id number] : [id number] Maame FLORES     CT chest 11/8 reviewed    Microbiology reviewed           Active Hospital Problems    Diagnosis  POA   • **Pneumonia due to COVID-19 virus [U07.1, J12.82]  Yes   • Cytokine release syndrome, grade unspecified [D89.839]  Yes   • Prediabetes [R73.03]  Yes   • Acute respiratory failure with hypoxia (HCC) [J96.01]  Yes   • HTN (hypertension) [I10]  Yes   • Former smoker [Z87.891]  Not Applicable   • Class 2 obesity in adult [E66.9]  Yes      Resolved Hospital Problems   No resolved problems to display.         ASSESSMENT:    COVID-19 pneumonia  COPD with emphysema  Acute hypoxemic respiratory failure  Obesity with a BMI 37.1 kg/m²  Steroid-induced leukocytosis      PLAN:    Continue steroid and baricitinib.  Status post completed remdesivir  "course.  Wean oxygen as able.  Encourage pulmonary toilet.        Abhinav Meyer MD  Pulmonary and Critical Care Medicine  [facility], [facility]  2021    09:29 EST       Electronically signed by Abhinav Meyer MD at 21 0931     Juan José Brizuela MD at 21 1039           LOS: 5 days     Name: Flo Willoughby  Age: 41 y.o.  Sex: male  :  1980  MRN: [id number]         Primary Care Physician: Provider, No Known    Subjective   Subjective  No new complaints or overnight events.  Weaned down to 7.5 L of high flow oxygen this morning.  Overall feels better.    Objective   Vital Signs  Temp:  [96 °F (35.6 °C)-97.6 °F (36.4 °C)] 97.6 °F (36.4 °C)  Heart Rate:  [61-64] 64  Resp:  [16-20] 20  BP: (110-135)/(76-86) 110/76  Body mass index is 37.12 kg/m².    Objective:  General Appearance:  Comfortable and in no acute distress.    Vital signs: (most recent): Blood pressure 110/76, pulse 64, temperature 97.6 °F (36.4 °C), temperature source Oral, resp. rate 20, height 167.6 cm (66\"), weight 104 kg (230 lb), SpO2 92 %.    Lungs:  Normal effort and normal respiratory rate.    Heart: Normal rate.  Regular rhythm.    Abdomen: Abdomen is soft.  Bowel sounds are normal.   There is no abdominal tenderness.     Extremities: There is no dependent edema or local swelling.    Neurological: Patient is alert and oriented to person, place and time.    Skin:  Warm and dry.              Results Review:       I reviewed the patient's new clinical results.    Results from last 7 days   Lab Units 21  0431 21  0513 21  0249 11/10/21  0500 21  0612 21  0910   WBC 10*3/mm3 20.37* 20.24* 19.41* 14.32* 10.75 15.99*   HEMOGLOBIN g/dL 15.3 15.0 14.5 14.6 15.0 15.6   PLATELETS 10*3/mm3 485* 467* 415 360 324 329     Results from last 7 days   Lab Units 21  0431 21  0513 21  0249 11/10/21  0500 21  0612 21  1431 21  0910   SODIUM mmol/L 137 139 140 " 140 139  --  143   POTASSIUM mmol/L 4.5 4.7 4.5 4.0 4.2  --  3.6   CHLORIDE mmol/L 102 105 106 106 103  --  104   CO2 mmol/L 25.8 25.5 24.3 23.3 24.1  --  23.5   BUN mg/dL 16 16 15 14 14  --  12   CREATININE mg/dL 0.80 0.87 0.71* 0.55* 0.59* 0.81 0.67*   CALCIUM mg/dL 8.6 8.8 8.7 8.9 8.7  --  8.7   GLUCOSE mg/dL 178* 165* 173* 189* 192*  --  140*                 Scheduled Meds:   atorvastatin, 20 mg, Oral, Nightly  baricitinib, 4 mg, Oral, Daily  budesonide-formoterol, 2 puff, Inhalation, BID - RT  dexamethasone, 6 mg, Oral, Q12H  enoxaparin, 40 mg, Subcutaneous, Q24H  gabapentin, 100 mg, Oral, TID  insulin lispro, 0-9 Units, Subcutaneous, 4x Daily With Meals & Nightly  metoprolol tartrate, 50 mg, Oral, Q12H  pantoprazole, 40 mg, Oral, Q AM      PRN Meds:   •  acetaminophen  •  cyclobenzaprine  •  dextrose  •  dextrose  •  glucagon (human recombinant)  •  melatonin  •  Morphine  •  nitroglycerin  •  ondansetron  •  Pharmacy to Dose Baricitinib (COVID-19)  Continuous Infusions:  Pharmacy to Dose Baricitinib (COVID-19),         Assessment/Plan   Active Hospital Problems    Diagnosis  POA   • **Pneumonia due to COVID-19 virus [U07.1, J12.82]  Yes   • Cytokine release syndrome, grade unspecified [D89.839]  Yes   • Prediabetes [R73.03]  Yes   • Acute respiratory failure with hypoxia (HCC) [J96.01]  Yes   • HTN (hypertension) [I10]  Yes   • Former smoker [Z87.891]  Not Applicable   • Class 2 obesity in adult [E66.9]  Yes      Resolved Hospital Problems   No resolved problems to display.       Assessment & Plan    -Continue Decadron, day 6.  Has completed remdesivir.  Baricitinib day 5.  CRP downtrending.    Oxygenation slowly improving.  On 7-8 L high flow at this point  -Pulmonology checking alpha-1 antitrypsin  -Blood sugars reasonably controlled and will continue current regimen  -Lovenox for DVT prophylaxis         I wore full protective equipment throughout the patient encounter including eye protection and facemask.   Hand hygiene was performed before donning protective equipment and after removal when leaving the room.    Juan José Brizuela MD  [facility]  21  10:39 EST      Electronically signed by Juan José Brizueal MD at 21 1041     Abhinav Meyer MD at 21 0748              [facility] INPATIENT PROGRESS NOTE         [address]    2021      PATIENT IDENTIFICATION:  Name: Flo Willoughby ADMIT: 2021   : 1980  PCP: Provider, No Known    MRN: [id number] LOS: 5 days   AGE/SEX: 41 y.o. male  ROOM: [facility]                     LOS 5    Reason for visit: COVID-19 pneumonia with respiratory failure      SUBJECTIVE:      On 8 L high flow nasal cannula oxygen.  Saturation is 96% no productive cough or chest pain.  Diminished breath sounds bilaterally.  No wheezing or rhonchi.  No Nausea or vomiting.   I am seeing the patient for the first time today.  All patient problems are new to me.      Objective   OBJECTIVE:    Vital Sign Min/Max for last 24 hours  Temp  Min: 96.1 °F (35.6 °C)  Max: 97.6 °F (36.4 °C)   BP  Min: 110/76  Max: 135/86   Pulse  Min: 48  Max: 95   Resp  Min: 16  Max: 20   SpO2  Min: 92 %  Max: 97 %   No data recorded   No data recorded                         Body mass index is 37.12 kg/m².    Intake/Output Summary (Last 24 hours) at 2021 1332  Last data filed at 2021 1251  Gross per 24 hour   Intake --   Output 2050 ml   Net -2050 ml         Exam:  GEN:  No distress, appears stated age  EYES:   PERRL, anicteric sclerae  ENT:    External ears/nose normal, OP clear  NECK:  No adenopathy, midline trachea  LUNGS: Normal chest on inspection, palpation and diminished bilaterally on auscultation  CV:  Normal S1S2, without murmur  ABD:  Nontender, nondistended, no hepatosplenomegaly, +BS  EXT:  No edema.  No cyanosis or clubbing.  No mottling and normal cap refill.    Assessment     Scheduled meds:  atorvastatin, 20 mg, Oral,  Nightly  baricitinib, 4 mg, Oral, Daily  budesonide-formoterol, 2 puff, Inhalation, BID - RT  dexamethasone, 6 mg, Oral, Q12H  enoxaparin, 40 mg, Subcutaneous, Q24H  gabapentin, 100 mg, Oral, TID  insulin lispro, 0-9 Units, Subcutaneous, 4x Daily With Meals & Nightly  metoprolol tartrate, 50 mg, Oral, Q12H  pantoprazole, 40 mg, Oral, Q AM      IV meds:                      Pharmacy to Dose Baricitinib (COVID-19),       Data Review:  Results from last 7 days   Lab Units 11/13/21  0431 11/12/21  0513 11/12/21  0513 11/11/21  0249 11/11/21  0249 11/10/21  0500 11/10/21  0500 11/09/21  0612 11/09/21  0612   SODIUM mmol/L 137  --  139  --  140  --  140  --  139   POTASSIUM mmol/L 4.5  --  4.7  --  4.5  --  4.0  --  4.2   CHLORIDE mmol/L 102  --  105  --  106  --  106  --  103   CO2 mmol/L 25.8  --  25.5  --  24.3  --  23.3  --  24.1   BUN mg/dL 16  --  16  --  15  --  14  --  14   CREATININE mg/dL 0.80  --  0.87  --  0.71*  --  0.55*  --  0.59*   GLUCOSE mg/dL 178*   < > 165*   < > 173*   < > 189*   < > 192*   CALCIUM mg/dL 8.6  --  8.8  --  8.7  --  8.9  --  8.7    < > = values in this interval not displayed.         Estimated Creatinine Clearance: 135.9 mL/min (by C-G formula based on SCr of 0.8 mg/dL).  Results from last 7 days   Lab Units 11/13/21  0431 11/12/21  0513 11/11/21  0249 11/10/21  0500 11/09/21  0612   WBC 10*3/mm3 20.37* 20.24* 19.41* 14.32* 10.75   HEMOGLOBIN g/dL 15.3 15.0 14.5 14.6 15.0   PLATELETS 10*3/mm3 485* 467* 415 360 324         Results from last 7 days   Lab Units 11/13/21  0431 11/12/21  0513 11/11/21  0249 11/10/21  0500 11/09/21  0612   ALT (SGPT) U/L 47* 40 35 31 31   AST (SGOT) U/L 18 16 22 24 32         Results from last 7 days   Lab Units 11/13/21  0431 11/11/21  0249 11/09/21  0612 11/08/21  1431 11/08/21  0910   PROCALCITONIN ng/mL 0.04 0.06 0.07 0.07 0.04   LACTATE mmol/L  --   --   --  1.7 2.5*         Glucose   Date/Time Value Ref Range Status   11/13/2021 1053 173 (H) 70 - 130  mg/dL Final     Comment:     Meter: [id number] : [id number] Barbie Lundy NA   11/13/2021 0553 164 (H) 70 - 130 mg/dL Final     Comment:     Meter: [id number] : [id number] Sriram Horton NA   11/12/2021 1941 245 (H) 70 - 130 mg/dL Final     Comment:     Meter: [id number] : [id number] Sriram Finnis NA   11/12/2021 1537 194 (H) 70 - 130 mg/dL Final     Comment:     Meter: [id number] : [id number] Isabel Luisa MARK   11/12/2021 1047 174 (H) 70 - 130 mg/dL Final     Comment:     Meter: [id number] : [id number] Isabel Luisa MARK   11/12/2021 0550 143 (H) 70 - 130 mg/dL Final     Comment:     Meter: [id number] : [id number] Maxim Coronadoa NA   11/11/2021 2228 214 (H) 70 - 130 mg/dL Final     Comment:     Meter: [id number] : [id number] Pan Nataia NA     CT chest 11/8 reviewed    Microbiology reviewed           Active Hospital Problems    Diagnosis  POA   • **Pneumonia due to COVID-19 virus [U07.1, J12.82]  Yes   • Cytokine release syndrome, grade unspecified [D89.839]  Yes   • Prediabetes [R73.03]  Yes   • Acute respiratory failure with hypoxia (HCC) [J96.01]  Yes   • HTN (hypertension) [I10]  Yes   • Former smoker [Z87.891]  Not Applicable   • Class 2 obesity in adult [E66.9]  Yes      Resolved Hospital Problems   No resolved problems to display.         ASSESSMENT:    COVID-19 pneumonia  COPD with emphysema  Acute hypoxemic respiratory failure  Obesity with a BMI 37.1 kg/m²  Steroid-induced leukocytosis      PLAN:    Continue steroid and baricitinib.  Status post completed remdesivir course.  Wean oxygen as able.  Encourage pulmonary toilet.        Abhinav Meyer MD  Pulmonary and Critical Care Medicine  [facility]  11/13/2021    13:32 EST       Electronically signed by Abhinav Meyer MD at 11/13/21 1982

## 2021-11-16 ENCOUNTER — READMISSION MANAGEMENT (OUTPATIENT)
Dept: CALL CENTER | Facility: HOSPITAL | Age: 41
End: 2021-11-16

## 2021-11-16 VITALS
SYSTOLIC BLOOD PRESSURE: 125 MMHG | RESPIRATION RATE: 16 BRPM | BODY MASS INDEX: 36.96 KG/M2 | TEMPERATURE: 95.1 F | WEIGHT: 230 LBS | HEIGHT: 66 IN | HEART RATE: 75 BPM | OXYGEN SATURATION: 95 % | DIASTOLIC BLOOD PRESSURE: 69 MMHG

## 2021-11-16 LAB
ALBUMIN SERPL-MCNC: 3.7 G/DL (ref 3.5–5.2)
ALBUMIN/GLOB SERPL: 1.5 G/DL
ALP SERPL-CCNC: 71 U/L (ref 39–117)
ALT SERPL W P-5'-P-CCNC: 93 U/L (ref 1–41)
ANION GAP SERPL CALCULATED.3IONS-SCNC: 11.7 MMOL/L (ref 5–15)
AST SERPL-CCNC: 25 U/L (ref 1–40)
BILIRUB CONJ SERPL-MCNC: <0.2 MG/DL (ref 0–0.3)
BILIRUB SERPL-MCNC: 0.5 MG/DL (ref 0–1.2)
BUN SERPL-MCNC: 17 MG/DL (ref 6–20)
BUN/CREAT SERPL: 29.8 (ref 7–25)
CALCIUM SPEC-SCNC: 8.5 MG/DL (ref 8.6–10.5)
CHLORIDE SERPL-SCNC: 102 MMOL/L (ref 98–107)
CO2 SERPL-SCNC: 22.3 MMOL/L (ref 22–29)
CREAT SERPL-MCNC: 0.57 MG/DL (ref 0.76–1.27)
CRP SERPL-MCNC: 0.51 MG/DL (ref 0–0.5)
DEPRECATED RDW RBC AUTO: 40.2 FL (ref 37–54)
ERYTHROCYTE [DISTWIDTH] IN BLOOD BY AUTOMATED COUNT: 13.1 % (ref 12.3–15.4)
FERRITIN SERPL-MCNC: 1140 NG/ML (ref 30–400)
GFR SERPL CREATININE-BSD FRML MDRD: >150 ML/MIN/1.73
GLOBULIN UR ELPH-MCNC: 2.4 GM/DL
GLUCOSE BLDC GLUCOMTR-MCNC: 119 MG/DL (ref 70–130)
GLUCOSE BLDC GLUCOMTR-MCNC: 121 MG/DL (ref 70–130)
GLUCOSE SERPL-MCNC: 125 MG/DL (ref 65–99)
HCT VFR BLD AUTO: 44.9 % (ref 37.5–51)
HGB BLD-MCNC: 15.8 G/DL (ref 13–17.7)
LYMPHOCYTES # BLD MANUAL: 1.31 10*3/MM3 (ref 0.7–3.1)
LYMPHOCYTES NFR BLD MANUAL: 6 % (ref 19.6–45.3)
MAGNESIUM SERPL-MCNC: 2.4 MG/DL (ref 1.6–2.6)
MCH RBC QN AUTO: 30.4 PG (ref 26.6–33)
MCHC RBC AUTO-ENTMCNC: 35.2 G/DL (ref 31.5–35.7)
MCV RBC AUTO: 86.3 FL (ref 79–97)
METAMYELOCYTES NFR BLD MANUAL: 3 % (ref 0–0)
MYELOCYTES NFR BLD MANUAL: 2 % (ref 0–0)
NEUTROPHILS # BLD AUTO: 14.29 10*3/MM3 (ref 1.7–7)
NEUTROPHILS NFR BLD MANUAL: 87 % (ref 42.7–76)
NRBC BLD AUTO-RTO: 0.1 /100 WBC (ref 0–0.2)
PLAT MORPH BLD: NORMAL
PLATELET # BLD AUTO: 485 10*3/MM3 (ref 140–450)
PMV BLD AUTO: 9.4 FL (ref 6–12)
POTASSIUM SERPL-SCNC: 4.5 MMOL/L (ref 3.5–5.2)
PROT SERPL-MCNC: 6.1 G/DL (ref 6–8.5)
RBC # BLD AUTO: 5.2 10*6/MM3 (ref 4.14–5.8)
RBC MORPH BLD: NORMAL
SODIUM SERPL-SCNC: 136 MMOL/L (ref 136–145)
VARIANT LYMPHS NFR BLD MANUAL: 2 % (ref 0–5)
WBC # BLD AUTO: 16.42 10*3/MM3 (ref 3.4–10.8)
WBC MORPH BLD: NORMAL

## 2021-11-16 PROCEDURE — 80053 COMPREHEN METABOLIC PANEL: CPT | Performed by: INTERNAL MEDICINE

## 2021-11-16 PROCEDURE — 94799 UNLISTED PULMONARY SVC/PX: CPT

## 2021-11-16 PROCEDURE — 63710000001 DEXAMETHASONE PER 0.25 MG: Performed by: INTERNAL MEDICINE

## 2021-11-16 PROCEDURE — 85007 BL SMEAR W/DIFF WBC COUNT: CPT | Performed by: INTERNAL MEDICINE

## 2021-11-16 PROCEDURE — 82962 GLUCOSE BLOOD TEST: CPT

## 2021-11-16 PROCEDURE — 82728 ASSAY OF FERRITIN: CPT | Performed by: HOSPITALIST

## 2021-11-16 PROCEDURE — 83735 ASSAY OF MAGNESIUM: CPT | Performed by: HOSPITALIST

## 2021-11-16 PROCEDURE — 86140 C-REACTIVE PROTEIN: CPT | Performed by: INTERNAL MEDICINE

## 2021-11-16 PROCEDURE — 85025 COMPLETE CBC W/AUTO DIFF WBC: CPT | Performed by: INTERNAL MEDICINE

## 2021-11-16 PROCEDURE — 82248 BILIRUBIN DIRECT: CPT | Performed by: INTERNAL MEDICINE

## 2021-11-16 RX ORDER — PANTOPRAZOLE SODIUM 40 MG/1
40 TABLET, DELAYED RELEASE ORAL
Qty: 30 TABLET | Refills: 0 | Status: SHIPPED | OUTPATIENT
Start: 2021-11-17

## 2021-11-16 RX ORDER — DEXAMETHASONE 6 MG/1
6 TABLET ORAL
Qty: 1 TABLET | Refills: 0 | Status: SHIPPED | OUTPATIENT
Start: 2021-11-17

## 2021-11-16 RX ORDER — BUDESONIDE AND FORMOTEROL FUMARATE DIHYDRATE 80; 4.5 UG/1; UG/1
2 AEROSOL RESPIRATORY (INHALATION)
Qty: 10.2 G | Refills: 0 | Status: SHIPPED | OUTPATIENT
Start: 2021-11-16 | End: 2021-11-16

## 2021-11-16 RX ADMIN — PANTOPRAZOLE SODIUM 40 MG: 40 TABLET, DELAYED RELEASE ORAL at 06:11

## 2021-11-16 RX ADMIN — BARICITINIB 4 MG: 2 TABLET, FILM COATED ORAL at 13:03

## 2021-11-16 RX ADMIN — GABAPENTIN 100 MG: 100 CAPSULE ORAL at 10:28

## 2021-11-16 RX ADMIN — BUDESONIDE AND FORMOTEROL FUMARATE DIHYDRATE 2 PUFF: 160; 4.5 AEROSOL RESPIRATORY (INHALATION) at 08:28

## 2021-11-16 RX ADMIN — METOPROLOL TARTRATE 50 MG: 50 TABLET, FILM COATED ORAL at 10:28

## 2021-11-16 RX ADMIN — DEXAMETHASONE 6 MG: 4 TABLET ORAL at 10:27

## 2021-11-16 NOTE — PAYOR COMM NOTE
"Flo Willoughby (41 y.o. Male)     PLEASE SEE ATTACHED FOR CONTINUED STAY REVIEW.     REF#CASE [id number]    PLEASE CALL   OR  [phone]    THANK YOU    DIRK WALKER LPN CP            Date of Birth Social Security Number Address Home Phone MRN    1980  [address] [place] [address] [phone] [id number]    Buddhist Marital Status             None Single       Admission Date Admission Type Admitting Provider Attending Provider Department, Room/Bed    11/8/21 Emergency Rachel Fuentes MD Benton, John B, MD [address], [address]    Discharge Date Discharge Disposition Discharge Destination           Home or Self Care              Attending Provider: Enoc House MD    Allergies: Azithromycin    Isolation: Enh Drop/Con   Infection: COVID (confirmed) (11/08/21)   Code Status: CPR   Advance Care Planning Activity    Ht: 167.6 cm (66\")   Wt: 104 kg (230 lb)    Admission Cmt: None   Principal Problem: Pneumonia due to COVID-19 virus [U07.1,J12.82]                 Active Insurance as of 11/8/2021     Primary Coverage     Payor Plan Insurance Group Employer/Plan Group    [employer] [facility] [employer] PPO [id number]     Payor Plan Address Payor Plan Phone Number Payor Plan Fax Number Effective Dates    [address] [phone]  1/1/2021 - None Entered    [facility] [id number]       Subscriber Name Subscriber Birth Date Member ID       FLO WILLOUGHBY 1980 [id number]                 Emergency Contacts      (Rel.) Home Phone Work Phone Mobile Phone    Mary Jane Cuevas (Significant Other) -- -- [phone]            Oxygen Therapy (last day)     Date/Time SpO2 Device (Oxygen Therapy) Flow (L/min) Oxygen Concentration (%) ETCO2 (mmHg)    11/16/21 0828 -- nasal cannula 3 -- --    11/16/21 0700 95 nasal cannula 2 -- --    11/16/21 0605 93 nasal cannula 2 -- --    11/16/21 0500 95 nasal cannula 2 -- --    11/16/21 0300 95 nasal " cannula 2 -- --    21 0145 95 nasal cannula 2 -- --    11/15/21 2345 -- nasal cannula 2 -- --    11/15/21 2305 98 nasal cannula 2 -- --    11/15/21 2035 -- nasal cannula 2 -- --    11/15/21 1954 92 nasal cannula 2 -- --    11/15/21 1900 -- nasal cannula 2 -- --    11/15/21 1700 92 -- -- -- --    11/15/21 1633 91 -- -- -- --    11/15/21 1536 89 -- -- -- --    11/15/21 1500 91 -- -- -- --    11/15/21 1300 90 room air -- -- --    11/15/21 1100 90 nasal cannula 2 -- --    11/15/21 1000 82 -- -- -- --    11/15/21 0930 86 room air -- -- --    11/15/21 0903 89 room air -- -- --    11/15/21 0805 -- nasal cannula 2 -- --    11/15/21 0700 94 room air -- -- --    11/15/21 0616 -- room air -- -- --    11/15/21 0400 -- room air -- -- --    11/15/21 0000 -- nasal cannula 2 -- --          Intake & Output (last day)       11/15 0701   0700  0701   0700    P.O. 1680     Total Intake(mL/kg) 1680 (16.2)     Urine (mL/kg/hr)      Stool      Total Output      Net +1680           Urine Unmeasured Occurrence 2 x         Lines, Drains & Airways     Active LDAs     Name Placement date Placement time Site Days    Peripheral IV 11/10/21 1331 Right Hand 11/10/21  1331  Hand  5                Operative/Procedure Notes (last 24 hours)  Notes from 11/15/21 1232 through 21 1232   No notes of this type exist for this encounter.            Physician Progress Notes (last 24 hours)      John Mcneil MD at 11/15/21 6221          [facility]  [phone]  John Mcneil MD    Subjective:  LOS: 7    Chief Complaint: COVID-19 infection    Awake alert comfortable and sitting in a chair on low-flow oxygen.  Denies much cough or phlegm.  Eager to go home.  No underlying lung disease.  Ex-smoker, quit 1 year ago, but so far without consequences.    Objective   Vital Signs past 24hrs    Temp range: Temp (24hrs), Av.1 °F (35.6 °C), Min:95.5 °F (35.3 °C), Max:97.4 °F (36.3 °C)    BP range: BP: (107-137)/(65-78)  137/77  Pulse range: Heart Rate:  [] 65  Resp rate range: Resp:  [16-20] 16    Device (Oxygen Therapy): room airFlow (L/min):  [2] 2  Oxygen range:SpO2:  [82 %-94 %] 91 %      104 kg (230 lb); Body mass index is 37.12 kg/m².    Intake/Output Summary (Last 24 hours) at 11/15/2021 1715  Last data filed at 11/15/2021 1300  Gross per 24 hour   Intake 1020 ml   Output --   Net 1020 ml       Physical Exam  Constitutional:       Appearance: He is obese.   HENT:      Mouth/Throat:      Mouth: Mucous membranes are moist.   Eyes:      Pupils: Pupils are equal, round, and reactive to light.   Cardiovascular:      Rate and Rhythm: Normal rate and regular rhythm.      Heart sounds: No murmur heard.      Pulmonary:      Effort: Pulmonary effort is normal.      Breath sounds: Rales (minimal bibasilar) present.   Abdominal:      General: Bowel sounds are normal.      Palpations: Abdomen is soft. There is no mass.      Tenderness: There is no abdominal tenderness.   Musculoskeletal:         General: No swelling.   Neurological:      General: No focal deficit present.      Mental Status: He is alert.       Results Review:    I have reviewed the laboratory and imaging data since the last note by [facility] physician.  My annotations are noted in assessment and plan.    Results from last 7 days   Lab Units 11/15/21  0324 11/14/21  0502 11/13/21  0431 11/12/21  0513 11/12/21  0513 11/11/21  0249 11/11/21  0249   PROCALCITONIN ng/mL 0.03  --  0.04  --   --   --  0.06   FERRITIN ng/mL  --   --  1,214.00*  --  1,296.00*  --  1,647.00*   D DIMER QUANT MCGFEU/mL 0.35  --  <0.27  --   --   --  0.29   CRP mg/dL <0.30 0.49 0.90*   < > 1.75*   < > 3.89*    < > = values in this interval not displayed.       Medication Review:  I have reviewed the current MAR.  My annotations are noted in assessment and plan.    Pharmacy to Dose Baricitinib (COVID-19),       Plan   PCCM Problems  Acute hypoxic respiratory failure  Bilateral pulmonary  infiltrates  SARS-CoV-2 infection  Ex-smoker  Obesity      THESE ARE NEW MEDICAL PROBLEMS TO ME.    Plan of Treatment    Patient is doing quite well.  On steroids and baricitinib.  Completed remdesivir previously.  Finish out course for both.    Remains on low-flow oxygen.  Hopefully can wean off completely.    CT chest consistent with COVID-19 infection.  Recommend follow-up imaging in 2 months with CT chest as chest x-ray was apparently clear.    Patient is an ex-smoker but so far no evidence COPD.    Has risk factors for sleep apnea recommend outpatient sleep evaluation.    If desired patient can be discharged home with supplemental oxygen, at most I expect he will need this for another week or 2.    John Mcneil MD  11/15/21  17:15 EST    While in the room and during my examination of the patient I wore gloves, gown, mask, eye protection and followed enhanced droplet/contact isolation protocol and precautions.  I washed my hands before and after this patient encounter.    Part of this note may be an electronic transcription/translation of spoken language to printed text using the Dragon Dictation System.      Electronically signed by John Mcneil MD at 11/15/21 1720     Enoc House MD at 11/15/21 5908              Name: Flo Willoughby ADMIT: 2021   : 1980  PCP: Provider, No Known    MRN: [id number] LOS: 7 days   AGE/SEX: 41 y.o. male  ROOM: [facility]     Subjective   Subjective   Feeling okay today. No SOA or cough at rest. Gets winded when up in room. Tolerating diet. Voiding well.     Review of Systems   Constitutional: Negative for appetite change, chills, fatigue and fever.   HENT: Negative for ear pain, nosebleeds, sore throat, trouble swallowing and voice change.    Eyes: Negative for pain and visual disturbance.   Respiratory: Positive for shortness of breath (with exertion). Negative for cough, chest tightness and wheezing.    Cardiovascular: Negative for chest pain, palpitations and leg  swelling.   Gastrointestinal: Negative for abdominal pain, blood in stool, diarrhea, nausea and vomiting.   Endocrine: Negative for cold intolerance and heat intolerance.   Genitourinary: Negative for decreased urine volume, difficulty urinating, dysuria and hematuria.   Musculoskeletal: Negative for back pain and neck pain.   Skin: Negative for color change, pallor and rash.   Allergic/Immunologic: Negative for environmental allergies and food allergies.   Neurological: Negative for seizures, syncope, speech difficulty and light-headedness.   Hematological: Negative for adenopathy. Does not bruise/bleed easily.   Psychiatric/Behavioral: Negative for behavioral problems, confusion and hallucinations.       Objective   Objective   Vital Signs  Temp:  [95.5 °F (35.3 °C)-97.4 °F (36.3 °C)] 95.5 °F (35.3 °C)  Heart Rate:  [] 65  Resp:  [16-20] 16  BP: (107-137)/(65-78) 137/77  SpO2:  [82 %-94 %] 91 %  on  Flow (L/min):  [2] 2;   Device (Oxygen Therapy): room air  Body mass index is 37.12 kg/m².  Physical Exam  Vitals and nursing note reviewed.   Constitutional:       General: He is not in acute distress.     Appearance: He is obese. He is not ill-appearing, toxic-appearing or diaphoretic.   HENT:      Head: Normocephalic and atraumatic.      Right Ear: External ear normal.      Left Ear: External ear normal.      Nose: Nose normal.      Mouth/Throat:      Mouth: Mucous membranes are moist.      Pharynx: Oropharynx is clear.   Eyes:      General: No scleral icterus.        Right eye: No discharge.         Left eye: No discharge.      Extraocular Movements: Extraocular movements intact.      Conjunctiva/sclera: Conjunctivae normal.   Cardiovascular:      Rate and Rhythm: Normal rate and regular rhythm.      Pulses: Normal pulses.      Heart sounds: Normal heart sounds.   Pulmonary:      Effort: Pulmonary effort is normal. No respiratory distress.      Breath sounds: Normal breath sounds. No wheezing or rales.    Abdominal:      General: Bowel sounds are normal. There is no distension.      Palpations: Abdomen is soft.      Tenderness: There is no abdominal tenderness.   Musculoskeletal:         General: No swelling or deformity. Normal range of motion.      Cervical back: Neck supple. No rigidity.   Lymphadenopathy:      Cervical: No cervical adenopathy.   Skin:     General: Skin is warm and dry.      Capillary Refill: Capillary refill takes less than 2 seconds.      Coloration: Skin is not jaundiced.      Findings: No rash.   Neurological:      General: No focal deficit present.      Mental Status: He is alert and oriented to person, place, and time. Mental status is at baseline.      Cranial Nerves: No cranial nerve deficit.      Coordination: Coordination normal.   Psychiatric:         Mood and Affect: Mood normal.         Behavior: Behavior normal.         Thought Content: Thought content normal.         Results Review     I reviewed the patient's new clinical results.  Results from last 7 days   Lab Units 11/15/21  0324 11/14/21  0502 11/13/21  0431 11/12/21  0513   WBC 10*3/mm3 20.74* 20.18* 20.37* 20.24*   HEMOGLOBIN g/dL 16.8 16.1 15.3 15.0   PLATELETS 10*3/mm3 476* 502* 485* 467*     Results from last 7 days   Lab Units 11/15/21  0324 11/14/21  0502 11/13/21  0431 11/12/21  0513   SODIUM mmol/L 135* 136 137 139   POTASSIUM mmol/L 5.0 4.9 4.5 4.7   CHLORIDE mmol/L 101 101 102 105   CO2 mmol/L 21.1* 23.7 25.8 25.5   BUN mg/dL 19 17 16 16   CREATININE mg/dL 0.73* 0.70* 0.80 0.87   GLUCOSE mg/dL 138* 181* 178* 165*   EGFR IF NONAFRICN AM mL/min/1.73 118 124 106 96     Results from last 7 days   Lab Units 11/15/21  0324 11/14/21  0502 11/13/21 0431 11/12/21  0513   ALBUMIN g/dL 3.60 3.60 3.60 3.60   BILIRUBIN mg/dL 0.4 0.4 0.4 0.3   ALK PHOS U/L 62 62 60 72   AST (SGOT) U/L 19 12 18 16   ALT (SGPT) U/L 57* 50* 47* 40     Results from last 7 days   Lab Units 11/15/21  0324 11/14/21  0502 11/13/21  0431 11/12/21  0513    CALCIUM mg/dL 8.4* 8.7 8.6 8.8   ALBUMIN g/dL 3.60 3.60 3.60 3.60     Results from last 7 days   Lab Units 11/15/21  0324 11/13/21  0431 11/11/21  0249 11/09/21  0612   PROCALCITONIN ng/mL 0.03 0.04 0.06 0.07     COVID19   Date Value Ref Range Status   11/08/2021 Detected (C) Not Detected - Ref. Range Final     Glucose   Date/Time Value Ref Range Status   11/15/2021 1616 204 (H) 70 - 130 mg/dL Final     Comment:     Meter: [id number] : [id number] Meño Chacono NA   11/15/2021 1117 130 70 - 130 mg/dL Final     Comment:     Meter: [id number] : [id number] Meño Chacono NA   11/15/2021 0616 124 70 - 130 mg/dL Final     Comment:     Meter: [id number] : [id number] Scarlett Afshin NA   11/14/2021 1944 184 (H) 70 - 130 mg/dL Final     Comment:     Meter: [id number] : [id number] Pantoja Afshin NA   11/14/2021 1558 176 (H) 70 - 130 mg/dL Final     Comment:     Meter: [id number] : [id number] Barbie Pickett Nerisa NA   11/14/2021 1111 192 (H) 70 - 130 mg/dL Final     Comment:     Meter: [id number] : [id number] Barbie Pickett Nerisa NA   11/14/2021 0545 175 (H) 70 - 130 mg/dL Final     Comment:     Meter: [id number] : [id number] Patricia Saldivar MIKE       CT Angiogram Chest  Narrative: CT ANGIOGRAM CHEST WITH CONTRAST, PULMONARY EMBOLISM PROTOCOL     HISTORY: 41-year-old male with history of  COVID, evaluate for pulmonary  embolism. Hypoxia.     TECHNIQUE: Spiral CT images were obtained from the lung apices to the  diaphragmatic domes following administration of intravenous contrast in  the angiographic phase. Coronal, sagittal and 3-D volume rendered  reformats were then obtained.     COMPARISON: None.      FINDINGS: Slight delay in obtaining images with decreased opacification  within the pulmonary arteries. This limits evaluation of segmental  arteries. There are no convincing evidence of pulmonary artery  thromboembolism. Thoracic aorta is normal without evidence of  dissection. No pleural or pericardial  effusion is seen. Mildly enlarged  mediastinal and hilar lymphadenopathy is seen. Index subcarinal lymph  node measures up to 1.1 cm in short axis dimension. A right infrahilar  lymph node measures up to 9 mm in short axis dimension. A left hilar  lymph node measures up to 1.1 cm in short axis dimension. The central  airways are patent without endobronchial lesion. There are areas of both  peripheral and central ground glass as well as consolidative opacity in  a distribution consistent with COVID-19 pneumonia. Background  emphysematous changes are present.      Impression: Slight delay in obtaining images leading to reduced  opacification within the pulmonary arterial branches however there is no  convincing evidence of pulmonary artery thromboembolism. CT findings  suggestive of COVID-19 pneumonia. Mildly enlarged mediastinal and hilar  lymph nodes are favored to be reactive.     These findings were discussed with Dr. Lange by telephone at the time  of dictation.     Radiation dose reduction techniques were utilized, including automated  exposure control and exposure modulation based on body size.     This report was finalized on 11/9/2021 1:11 PM by Dr. Ramy Olivas M.D.       Scheduled Medications  atorvastatin, 20 mg, Oral, Nightly  baricitinib, 4 mg, Oral, Daily  budesonide-formoterol, 2 puff, Inhalation, BID - RT  dexamethasone, 6 mg, Oral, Daily With Breakfast  enoxaparin, 40 mg, Subcutaneous, Q24H  gabapentin, 100 mg, Oral, TID  insulin lispro, 0-9 Units, Subcutaneous, 4x Daily With Meals & Nightly  metoprolol tartrate, 50 mg, Oral, Q12H  pantoprazole, 40 mg, Oral, Q AM    Infusions  Pharmacy to Dose Baricitinib (COVID-19),     Diet  Diet Regular; Cardiac, Consistent Carbohydrate      Assessment/Plan     Active Hospital Problems    Diagnosis  POA   • **Pneumonia due to COVID-19 virus [U07.1, J12.82]  Yes   • Cytokine release syndrome, grade unspecified [D89.839]  Yes   • Prediabetes [R73.03]  Yes   •  "Acute respiratory failure with hypoxia (HCC) [J96.01]  Yes   • HTN (hypertension) [I10]  Yes   • Former smoker [Z87.891]  Not Applicable   • Class 2 obesity in adult [E66.9]  Yes      Resolved Hospital Problems   No resolved problems to display.       41 y.o. male admitted with acute hypoxic respiratory failure due to COViD pneumonia.    -Continue steroid (Decadron), day 8 of 10.  Has completed course of remdesivir.  FLORENCIO-inhibitor (Baricitinib) day 6 of 14. Oxygenation slowly improving. O2 around 91-92% on 3L/min at present. Dropped to 84% on RA with ambulation, 88% at rest on RA. CRP now back within normal limits. Have decreased Decadron to once daily.  -Pulmonology checking alpha-1 antitrypsin as imaging concerning for COPD \"more extensive than I would expect for a 40yo\", continue Symbicort  -Blood sugars reasonably controlled with SSI while on steroids, will continue current regimen, not on DM meds at home.  -BPs acceptable on metoprolol, amlodipine on hold    During all interaction with pt proper PPE was utilized (gown, gloves, mask, goggles, and face shield) and was donned/doffed according to recommendations. Hands were cleaned before and after interaction.    · Lovenox 40 mg SC daily for DVT prophylaxis.  · Full code.  · Discussed with patient, nursing staff and CCP. D/w Dr. Irwin.  · Anticipate discharge home with family in 1-2 days. Would like for him to be stable on RA prior to dc.      Enoc House MD  [facility]  11/15/21  16:55 EST        Electronically signed by Enoc House MD at 11/15/21 1709              Discharge Summary      Enoc House MD at 21 1203              Patient Name: Flo Willoughby  : 1980  MRN: [id number]    Date of Admission: 2021  Date of Discharge:  2021  Primary Care Physician: Provider, No Known      Chief Complaint:   Exposure To Known Illness, Cough, and Shortness of Breath      Discharge Diagnoses     Active Hospital " "Problems    Diagnosis  POA   • **Pneumonia due to COVID-19 virus [U07.1, J12.82]  Yes   • Cytokine release syndrome, grade unspecified [D89.839]  Yes   • Prediabetes [R73.03]  Yes   • Acute respiratory failure with hypoxia (HCC) [J96.01]  Yes   • HTN (hypertension) [I10]  Yes   • Former smoker [Z87.891]  Not Applicable   • Class 2 obesity in adult [E66.9]  Yes      Resolved Hospital Problems   No resolved problems to display.        Hospital Course     41 y.o. male admitted with acute hypoxic respiratory failure due to COViD pneumonia. Please see below for details of admission:     -Continue steroid (Decadron), day 9 of 10 (complete course as outpt per Pulm).  Has completed course of remdesivir.  FLORENCIO-inhibitor (Baricitinib) day 7 of 14 (do not continue at dc per Pulm). Oxygenation slowly improving. O2 around 91-92% on 2L/min at present. Dropped to 84% on RA with ambulation, 88% at rest on RA. CRP much lower than admission.   -Pulm okay with pt's dc home today on 2L/min O2 by NC, can f/u with PCP in a week for recheck of O2 levels on RA  -Needs to isolate for 20d from onset of symptoms--can come out of isolation on 11/18  -D/w ID and they recommend COViD vaccine 6-8 weeks from now  -Pulmonology checked alpha-1 antitrypsin as imaging concerning for COPD \"more extensive than I would expect for a 40yo\", it was wnl, continue Symbicort at [place].  -will need to f/u with Pulm as outpt in 2 months for recheck of CT chest and further tx of COPD  -Blood sugars reasonably controlled with SSI while on steroids, not on DM meds at home, will need to f/u with PCP regarding diet/exercise and monitoring of A1c.  -BPs acceptable on metoprolol, amlodipine on hold, defer restarting amlodipine to PCP at f/u.     During all interaction with pt proper PPE was utilized (gown, gloves, mask, goggles, and face shield) and was donned/doffed according to recommendations. Hands were cleaned before and after interaction.    D/w pt, Dr. Mcneil, and " CCP    Day of Discharge     Subjective:  Feeling okay today. No SOA or cough at rest. Gets winded when up in room. Tolerating diet. Voiding well.     Physical Exam:  Temp:  [95.1 °F (35.1 °C)-96.3 °F (35.7 °C)] 95.1 °F (35.1 °C)  Heart Rate:  [46-77] 75  Resp:  [16-20] 16  BP: (120-137)/(69-81) 125/69  Body mass index is 37.12 kg/m².  Physical Exam  Vitals and nursing note reviewed.   Constitutional:       General: He is not in acute distress.     Appearance: He is obese. He is not ill-appearing, toxic-appearing or diaphoretic.   HENT:      Head: Normocephalic and atraumatic.      Right Ear: External ear normal.      Left Ear: External ear normal.      Nose: Nose normal.      Mouth/Throat:      Mouth: Mucous membranes are moist.      Pharynx: Oropharynx is clear.   Eyes:      General: No scleral icterus.        Right eye: No discharge.         Left eye: No discharge.      Extraocular Movements: Extraocular movements intact.      Conjunctiva/sclera: Conjunctivae normal.   Cardiovascular:      Rate and Rhythm: Normal rate and regular rhythm.      Pulses: Normal pulses.      Heart sounds: Normal heart sounds.   Pulmonary:      Effort: Pulmonary effort is normal. No respiratory distress.      Breath sounds: Normal breath sounds. No wheezing or rales.   Abdominal:      General: Bowel sounds are normal. There is no distension.      Palpations: Abdomen is soft.      Tenderness: There is no abdominal tenderness.   Musculoskeletal:         General: No swelling or deformity. Normal range of motion.      Cervical back: Neck supple. No rigidity.   Lymphadenopathy:      Cervical: No cervical adenopathy.   Skin:     General: Skin is warm and dry.      Capillary Refill: Capillary refill takes less than 2 seconds.      Coloration: Skin is not jaundiced.      Findings: No rash.   Neurological:      General: No focal deficit present.      Mental Status: He is alert and oriented to person, place, and time. Mental status is at  baseline.      Cranial Nerves: No cranial nerve deficit.      Coordination: Coordination normal.   Psychiatric:         Mood and Affect: Mood normal.         Behavior: Behavior normal.         Thought Content: Thought content normal.         Consultants     Consult Orders (all) (From admission, onward)     Start     Ordered    11/09/21 1157  Inpatient Infectious Diseases Consult  Once        Specialty:  Infectious Diseases  Provider:  Jaison Garcia MD    11/09/21 1156    11/08/21 1751  Inpatient Pulmonology Consult  Once        Specialty:  Pulmonary Disease  Provider:  Eric Charles MD    11/08/21 1750    11/08/21 1123  [facility] (on-call MD unless specified) Details  Once        Specialty:  Hospitalist  Provider:  Rachel Fuentes MD    11/08/21 1122              Procedures     * Surgery not found *      Imaging Results (All)     Procedure Component Value Units Date/Time    CT Angiogram Chest [[id number]] Collected: 11/08/21 1206     Updated: 11/09/21 1314    Narrative:      CT ANGIOGRAM CHEST WITH CONTRAST, PULMONARY EMBOLISM PROTOCOL     HISTORY: 41-year-old male with history of  COVID, evaluate for pulmonary  embolism. Hypoxia.     TECHNIQUE: Spiral CT images were obtained from the lung apices to the  diaphragmatic domes following administration of intravenous contrast in  the angiographic phase. Coronal, sagittal and 3-D volume rendered  reformats were then obtained.     COMPARISON: None.      FINDINGS: Slight delay in obtaining images with decreased opacification  within the pulmonary arteries. This limits evaluation of segmental  arteries. There are no convincing evidence of pulmonary artery  thromboembolism. Thoracic aorta is normal without evidence of  dissection. No pleural or pericardial effusion is seen. Mildly enlarged  mediastinal and hilar lymphadenopathy is seen. Index subcarinal lymph  node measures up to 1.1 cm in short axis dimension. A right infrahilar  lymph node measures up to 9 mm  in short axis dimension. A left hilar  lymph node measures up to 1.1 cm in short axis dimension. The central  airways are patent without endobronchial lesion. There are areas of both  peripheral and central ground glass as well as consolidative opacity in  a distribution consistent with COVID-19 pneumonia. Background  emphysematous changes are present.        Impression:      Slight delay in obtaining images leading to reduced  opacification within the pulmonary arterial branches however there is no  convincing evidence of pulmonary artery thromboembolism. CT findings  suggestive of COVID-19 pneumonia. Mildly enlarged mediastinal and hilar  lymph nodes are favored to be reactive.     These findings were discussed with Dr. Lange by telephone at the time  of dictation.     Radiation dose reduction techniques were utilized, including automated  exposure control and exposure modulation based on body size.     This report was finalized on 11/9/2021 1:11 PM by Dr. Ramy Olivas M.D.       XR Chest AP [[id number]] Collected: 11/08/21 0938     Updated: 11/08/21 0942    Narrative:      PORTABLE CHEST 11/08/2021  AM     CLINICAL HISTORY: COVID evaluation. Cough. Fever.     The lungs are fairly well-expanded and appear free of infiltrates. There  is minimal atelectasis at the right lung base. There are no pleural  effusions. The heart is at the upper limits of normal in size.     IMPRESSIONS: No evidence of acute disease within the chest.     This report was finalized on 11/8/2021 9:39 AM by Dr. Yonny Wray M.D.               Pertinent Labs     Results from last 7 days   Lab Units 11/16/21  0258 11/15/21  0324 11/14/21  0502 11/13/21  0431   WBC 10*3/mm3 16.42* 20.74* 20.18* 20.37*   HEMOGLOBIN g/dL 15.8 16.8 16.1 15.3   PLATELETS 10*3/mm3 485* 476* 502* 485*     Results from last 7 days   Lab Units 11/16/21  0258 11/15/21  0324 11/14/21  0502 11/13/21  0431   SODIUM mmol/L 136 135* 136 137   POTASSIUM mmol/L 4.5  5.0 4.9 4.5   CHLORIDE mmol/L 102 101 101 102   CO2 mmol/L 22.3 21.1* 23.7 25.8   BUN mg/dL 17 19 17 16   CREATININE mg/dL 0.57* 0.73* 0.70* 0.80   GLUCOSE mg/dL 125* 138* 181* 178*   EGFR IF NONAFRICN AM mL/min/1.73 >150 118 124 106     Results from last 7 days   Lab Units 11/16/21  0258 11/15/21  0324 11/14/21  0502 11/13/21  0431   ALBUMIN g/dL 3.70 3.60 3.60 3.60   BILIRUBIN mg/dL 0.5 0.4 0.4 0.4   ALK PHOS U/L 71 62 62 60   AST (SGOT) U/L 25 19 12 18   ALT (SGPT) U/L 93* 57* 50* 47*     Results from last 7 days   Lab Units 11/16/21  0258 11/15/21  0324 11/14/21  0502 11/13/21  0431   CALCIUM mg/dL 8.5* 8.4* 8.7 8.6   ALBUMIN g/dL 3.70 3.60 3.60 3.60   MAGNESIUM mg/dL 2.4  --   --   --        Results from last 7 days   Lab Units 11/15/21  0324 11/13/21  0431 11/11/21  0249   D DIMER QUANT MCGFEU/mL 0.35 <0.27 0.29           Invalid input(s): LDLCALC          Test Results Pending at Discharge       Discharge Details        Discharge Medications      New Medications      Instructions Start Date   budesonide-formoterol 80-4.5 MCG/ACT inhaler  Commonly known as: Symbicort   2 puffs, Inhalation, 2 Times Daily - RT      dexamethasone 6 MG tablet  Commonly known as: DECADRON   6 mg, Oral, Daily With Breakfast   Start Date: November 17, 2021     pantoprazole 40 MG EC tablet  Commonly known as: PROTONIX   40 mg, Oral, Every Early Morning   Start Date: November 17, 2021        Continue These Medications      Instructions Start Date   atorvastatin 20 MG tablet  Commonly known as: LIPITOR   20 mg, Oral, Nightly      cyclobenzaprine 5 MG tablet  Commonly known as: FLEXERIL   5 mg, Oral, Nightly PRN, Patient takes 1-2 tablets at bedtime as needed      gabapentin 100 MG capsule  Commonly known as: NEURONTIN   100 mg, Oral, 3 Times Daily      metoprolol tartrate 50 MG tablet  Commonly known as: LOPRESSOR   50 mg, Oral, Every 12 Hours         Stop These Medications    amLODIPine 10 MG tablet  Commonly known as: NORVASC             Allergies   Allergen Reactions   • Azithromycin Shortness Of Breath     HIVES AND DIFFICULTY BREATHING       Discharge Disposition:  Home or Self Care      Discharge Diet:  Diet Order   Procedures   • Diet Regular; Cardiac, Consistent Carbohydrate       Discharge Activity:   as tolerated, use O2 until f/u with PCP, needs to isolate through 11/18    CODE STATUS:    Code Status and Medical Interventions:   Ordered at: 11/08/21 1318     Code Status (Patient has no pulse and is not breathing):    CPR (Attempt to Resuscitate)     Medical Interventions (Patient has pulse or is breathing):    Full Support       No future appointments.  Additional Instructions for the Follow-ups that You Need to Schedule     Discharge Follow-up with PCP   As directed       Currently Documented PCP:    Provider, No Known    PCP Phone Number:    [phone]     Follow Up Details: Dr. Paul Carpenter (PCP) in 1-2 weeks         Discharge Follow-up with Specified Provider: Dr. Mcneil ([facility]); 2 Months   As directed      To: Dr. Mcneil (Pulm)    Follow Up: 2 Months            Follow-up Information     Provider, No Known .    Why: Dr. Paul Carpenter (PCP) in 1-2 weeks  Contact information:  [place] [address]  [phone]                         Additional Instructions for the Follow-ups that You Need to Schedule     Discharge Follow-up with PCP   As directed       Currently Documented PCP:    Provider, No Known    PCP Phone Number:    [phone]     Follow Up Details: Dr. Paul Carpenter (PCP) in 1-2 weeks         Discharge Follow-up with Specified Provider: Dr. Mcneil ([facility]); 2 Months   As directed      To: Dr. Mcneil (Pulm)    Follow Up: 2 Months           Time Spent on Discharge:  Greater than 30 minutes      Enoc House MD  [facility]  11/16/21  12:04 EST                Electronically signed by Enoc House MD at 11/16/21 6208

## 2021-11-16 NOTE — CASE MANAGEMENT/SOCIAL WORK
Continued Stay Note  [facility]     Patient Name: Flo Willoughby  MRN: [id number]  Today's Date: 11/16/2021    Admit Date: 11/8/2021     Discharge Plan     Row Name 11/16/21 1523       Plan    Plan Plans are home with family later today. [facility] will provide o2.  CCP had trouble with insurance company with PA for inhaler, they kept changing inhaler they would approve, MD made changes, but we could not get it approved, states plans are to send pt home on origianl inhaler Symbicort and have him follow up with primary   Althea OBRIEN RN/CCP               Discharge Codes    No documentation.               Expected Discharge Date and Time     Expected Discharge Date Expected Discharge Time    Nov 16, 2021             Althea Bravo RN

## 2021-11-16 NOTE — DISCHARGE SUMMARY
"    Patient Name: Flo Willoughby  : 1980  MRN: [id number]    Date of Admission: 2021  Date of Discharge:  2021  Primary Care Physician: Provider, No Known      Chief Complaint:   Exposure To Known Illness, Cough, and Shortness of Breath      Discharge Diagnoses     Active Hospital Problems    Diagnosis  POA   • **Pneumonia due to COVID-19 virus [U07.1, J12.82]  Yes   • Cytokine release syndrome, grade unspecified [D89.839]  Yes   • Prediabetes [R73.03]  Yes   • Acute respiratory failure with hypoxia (HCC) [J96.01]  Yes   • HTN (hypertension) [I10]  Yes   • Former smoker [Z87.891]  Not Applicable   • Class 2 obesity in adult [E66.9]  Yes      Resolved Hospital Problems   No resolved problems to display.        Hospital Course     41 y.o. male admitted with acute hypoxic respiratory failure due to COViD pneumonia. Please see below for details of admission:     -Continue steroid (Decadron), day 9 of 10 (complete course as outpt per Pulm).  Has completed course of remdesivir.  FLORENCIO-inhibitor (Baricitinib) day 7 of 14 (do not continue at dc per Pulm). Oxygenation slowly improving. O2 around 91-92% on 2L/min at present. Dropped to 84% on RA with ambulation, 88% at rest on RA. CRP much lower than admission.   -Pulm okay with pt's dc home today on 2L/min O2 by NC, can f/u with PCP in a week for recheck of O2 levels on RA  -Needs to isolate for 20d from onset of symptoms--can come out of isolation on   -D/w ID and they recommend COViD vaccine 6-8 weeks from now  -Pulmonology checked alpha-1 antitrypsin as imaging concerning for COPD \"more extensive than I would expect for a 42yo\", it was wnl, continue Symbicort at [place].  -will need to f/u with Pulm as outpt in 2 months for recheck of CT chest and further tx of COPD  -Blood sugars reasonably controlled with SSI while on steroids, not on DM meds at home, will need to f/u with PCP regarding diet/exercise and monitoring of A1c.  -BPs acceptable on metoprolol, " amlodipine on hold, defer restarting amlodipine to PCP at f/u.     During all interaction with pt proper PPE was utilized (gown, gloves, mask, goggles, and face shield) and was donned/doffed according to recommendations. Hands were cleaned before and after interaction.    D/w pt, Dr. Mcneil, and CCP    Day of Discharge     Subjective:  Feeling okay today. No SOA or cough at rest. Gets winded when up in room. Tolerating diet. Voiding well.     Physical Exam:  Temp:  [95.1 °F (35.1 °C)-96.3 °F (35.7 °C)] 95.1 °F (35.1 °C)  Heart Rate:  [46-77] 75  Resp:  [16-20] 16  BP: (120-137)/(69-81) 125/69  Body mass index is 37.12 kg/m².  Physical Exam  Vitals and nursing note reviewed.   Constitutional:       General: He is not in acute distress.     Appearance: He is obese. He is not ill-appearing, toxic-appearing or diaphoretic.   HENT:      Head: Normocephalic and atraumatic.      Right Ear: External ear normal.      Left Ear: External ear normal.      Nose: Nose normal.      Mouth/Throat:      Mouth: Mucous membranes are moist.      Pharynx: Oropharynx is clear.   Eyes:      General: No scleral icterus.        Right eye: No discharge.         Left eye: No discharge.      Extraocular Movements: Extraocular movements intact.      Conjunctiva/sclera: Conjunctivae normal.   Cardiovascular:      Rate and Rhythm: Normal rate and regular rhythm.      Pulses: Normal pulses.      Heart sounds: Normal heart sounds.   Pulmonary:      Effort: Pulmonary effort is normal. No respiratory distress.      Breath sounds: Normal breath sounds. No wheezing or rales.   Abdominal:      General: Bowel sounds are normal. There is no distension.      Palpations: Abdomen is soft.      Tenderness: There is no abdominal tenderness.   Musculoskeletal:         General: No swelling or deformity. Normal range of motion.      Cervical back: Neck supple. No rigidity.   Lymphadenopathy:      Cervical: No cervical adenopathy.   Skin:     General: Skin is warm  and dry.      Capillary Refill: Capillary refill takes less than 2 seconds.      Coloration: Skin is not jaundiced.      Findings: No rash.   Neurological:      General: No focal deficit present.      Mental Status: He is alert and oriented to person, place, and time. Mental status is at baseline.      Cranial Nerves: No cranial nerve deficit.      Coordination: Coordination normal.   Psychiatric:         Mood and Affect: Mood normal.         Behavior: Behavior normal.         Thought Content: Thought content normal.         Consultants     Consult Orders (all) (From admission, onward)     Start     Ordered    11/09/21 1157  Inpatient Infectious Diseases Consult  Once        Specialty:  Infectious Diseases  Provider:  Jaison Garcia MD    11/09/21 1156    11/08/21 1751  Inpatient Pulmonology Consult  Once        Specialty:  Pulmonary Disease  Provider:  Eric Charles MD    11/08/21 1750    11/08/21 1123  [facility] (on-call MD unless specified) Details  Once        Specialty:  Hospitalist  Provider:  Rachel Fuentes MD    11/08/21 1122              Procedures     * Surgery not found *      Imaging Results (All)     Procedure Component Value Units Date/Time    CT Angiogram Chest [[id number]] Collected: 11/08/21 1206     Updated: 11/09/21 1314    Narrative:      CT ANGIOGRAM CHEST WITH CONTRAST, PULMONARY EMBOLISM PROTOCOL     HISTORY: 41-year-old male with history of  COVID, evaluate for pulmonary  embolism. Hypoxia.     TECHNIQUE: Spiral CT images were obtained from the lung apices to the  diaphragmatic domes following administration of intravenous contrast in  the angiographic phase. Coronal, sagittal and 3-D volume rendered  reformats were then obtained.     COMPARISON: None.      FINDINGS: Slight delay in obtaining images with decreased opacification  within the pulmonary arteries. This limits evaluation of segmental  arteries. There are no convincing evidence of pulmonary artery  thromboembolism.  Thoracic aorta is normal without evidence of  dissection. No pleural or pericardial effusion is seen. Mildly enlarged  mediastinal and hilar lymphadenopathy is seen. Index subcarinal lymph  node measures up to 1.1 cm in short axis dimension. A right infrahilar  lymph node measures up to 9 mm in short axis dimension. A left hilar  lymph node measures up to 1.1 cm in short axis dimension. The central  airways are patent without endobronchial lesion. There are areas of both  peripheral and central ground glass as well as consolidative opacity in  a distribution consistent with COVID-19 pneumonia. Background  emphysematous changes are present.        Impression:      Slight delay in obtaining images leading to reduced  opacification within the pulmonary arterial branches however there is no  convincing evidence of pulmonary artery thromboembolism. CT findings  suggestive of COVID-19 pneumonia. Mildly enlarged mediastinal and hilar  lymph nodes are favored to be reactive.     These findings were discussed with Dr. Lange by telephone at the time  of dictation.     Radiation dose reduction techniques were utilized, including automated  exposure control and exposure modulation based on body size.     This report was finalized on 11/9/2021 1:11 PM by Dr. Ramy Olivas M.D.       XR Chest AP [[id number]] Collected: 11/08/21 0938     Updated: 11/08/21 0942    Narrative:      PORTABLE CHEST 11/08/2021  AM     CLINICAL HISTORY: COVID evaluation. Cough. Fever.     The lungs are fairly well-expanded and appear free of infiltrates. There  is minimal atelectasis at the right lung base. There are no pleural  effusions. The heart is at the upper limits of normal in size.     IMPRESSIONS: No evidence of acute disease within the chest.     This report was finalized on 11/8/2021 9:39 AM by Dr. Yonny Wray M.D.               Pertinent Labs     Results from last 7 days   Lab Units 11/16/21  0258 11/15/21  0324 11/14/21  0509  11/13/21 0431   WBC 10*3/mm3 16.42* 20.74* 20.18* 20.37*   HEMOGLOBIN g/dL 15.8 16.8 16.1 15.3   PLATELETS 10*3/mm3 485* 476* 502* 485*     Results from last 7 days   Lab Units 11/16/21  0258 11/15/21  0324 11/14/21  0502 11/13/21  0431   SODIUM mmol/L 136 135* 136 137   POTASSIUM mmol/L 4.5 5.0 4.9 4.5   CHLORIDE mmol/L 102 101 101 102   CO2 mmol/L 22.3 21.1* 23.7 25.8   BUN mg/dL 17 19 17 16   CREATININE mg/dL 0.57* 0.73* 0.70* 0.80   GLUCOSE mg/dL 125* 138* 181* 178*   EGFR IF NONAFRICN AM mL/min/1.73 >150 118 124 106     Results from last 7 days   Lab Units 11/16/21  0258 11/15/21  0324 11/14/21  0502 11/13/21  0431   ALBUMIN g/dL 3.70 3.60 3.60 3.60   BILIRUBIN mg/dL 0.5 0.4 0.4 0.4   ALK PHOS U/L 71 62 62 60   AST (SGOT) U/L 25 19 12 18   ALT (SGPT) U/L 93* 57* 50* 47*     Results from last 7 days   Lab Units 11/16/21  0258 11/15/21  0324 11/14/21  0502 11/13/21  0431   CALCIUM mg/dL 8.5* 8.4* 8.7 8.6   ALBUMIN g/dL 3.70 3.60 3.60 3.60   MAGNESIUM mg/dL 2.4  --   --   --        Results from last 7 days   Lab Units 11/15/21  0324 11/13/21  0431 11/11/21  0249   D DIMER QUANT MCGFEU/mL 0.35 <0.27 0.29           Invalid input(s): LDLCALC          Test Results Pending at Discharge       Discharge Details        Discharge Medications      New Medications      Instructions Start Date   dexamethasone 6 MG tablet  Commonly known as: DECADRON   6 mg, Oral, Daily With Breakfast   Start Date: November 17, 2021     fluticasone-salmeterol 250-50 MCG/DOSE DISKUS  Commonly known as: ADVAIR   1 puff, Inhalation, 2 Times Daily - RT      pantoprazole 40 MG EC tablet  Commonly known as: PROTONIX   40 mg, Oral, Every Early Morning   Start Date: November 17, 2021        Continue These Medications      Instructions Start Date   atorvastatin 20 MG tablet  Commonly known as: LIPITOR   20 mg, Oral, Nightly      cyclobenzaprine 5 MG tablet  Commonly known as: FLEXERIL   5 mg, Oral, Nightly PRN, Patient takes 1-2 tablets at bedtime as  needed      gabapentin 100 MG capsule  Commonly known as: NEURONTIN   100 mg, Oral, 3 Times Daily      metoprolol tartrate 50 MG tablet  Commonly known as: LOPRESSOR   50 mg, Oral, Every 12 Hours         Stop These Medications    amLODIPine 10 MG tablet  Commonly known as: NORVASC            Allergies   Allergen Reactions   • Azithromycin Shortness Of Breath     HIVES AND DIFFICULTY BREATHING       Discharge Disposition:  Home or Self Care      Discharge Diet:  Diet Order   Procedures   • Diet Regular; Cardiac, Consistent Carbohydrate       Discharge Activity:   as tolerated, use O2 until f/u with PCP, needs to isolate through 11/18    CODE STATUS:    Code Status and Medical Interventions:   Ordered at: 11/08/21 1318     Code Status (Patient has no pulse and is not breathing):    CPR (Attempt to Resuscitate)     Medical Interventions (Patient has pulse or is breathing):    Full Support       No future appointments.  Additional Instructions for the Follow-ups that You Need to Schedule     Discharge Follow-up with PCP   As directed       Currently Documented PCP:    Provider, No Known    PCP Phone Number:    [phone]     Follow Up Details: Dr. Paul Carpenter (PCP) in 1-2 weeks         Discharge Follow-up with Specified Provider: Dr. Mcneil ([facility]); 2 Months   As directed      To: Dr. Mcneil (Pulm)    Follow Up: 2 Months            Follow-up Information     Provider, No Known .    Why: Dr. Paul Carpenter (PCP) in 1-2 weeks  Contact information:  [place] [address]  [phone]                         Additional Instructions for the Follow-ups that You Need to Schedule     Discharge Follow-up with PCP   As directed       Currently Documented PCP:    Provider, No Known    PCP Phone Number:    [phone]     Follow Up Details: Dr. Paul Carpenter (PCP) in 1-2 weeks         Discharge Follow-up with Specified Provider: Dr. Mcneil ([facility]); 2 Months   As directed      To: Dr. Mcneil (Pul)    Follow Up: 2  Months           Time Spent on Discharge:  Greater than 30 minutes      Enoc House MD  [facility]  11/16/21  12:04 EST

## 2021-11-16 NOTE — PLAN OF CARE
Problem: Adult Inpatient Plan of Care  Goal: Plan of Care Review  Outcome: Ongoing, Progressing  Flowsheets (Taken 11/16/2021 0442)  Progress: improving  Plan of Care Reviewed With: patient  Outcome Summary: meds per order and request, no falls, sb, see labs and vs   Goal Outcome Evaluation:  Plan of Care Reviewed With: patient        Progress: improving  Outcome Summary: meds per order and request, no falls, sb, see labs and vs

## 2021-11-17 ENCOUNTER — READMISSION MANAGEMENT (OUTPATIENT)
Dept: CALL CENTER | Facility: HOSPITAL | Age: 41
End: 2021-11-17

## 2021-11-17 NOTE — OUTREACH NOTE
Prep Survey      Responses   [facility] patient discharged from? [place]   Is LACE score < 7 ? No   Emergency Room discharge w/ pulse ox? No   Eligibility Readm Mgmt   Discharge diagnosis Pneumonia due to COVID   Does the patient have one of the following disease processes/diagnoses(primary or secondary)? COVID-19   Does the patient have Home health ordered? No   Is there a DME ordered? Yes   What DME was ordered? [facility] will provide o2.   Prep survey completed? Yes          Katrin Witt RN

## 2021-11-17 NOTE — OUTREACH NOTE
COVID-19 Week 1 Survey      Responses   [facility] patient discharged from? [place]   Does the patient have one of the following disease processes/diagnoses(primary or secondary)? COVID-19   COVID-19 underlying condition? None   Call Number Call 1   Week 1 Call successful? Yes   Call start time 1125   Call end time 1138   Discharge diagnosis Pneumonia due to COVID   Is patient permission given to speak with other caregiver? Yes   Person spoke with today (if not patient) and relationship charlene/ sig other   Meds reviewed with patient/caregiver? Yes   Is the patient having any side effects they believe may be caused by any medication additions or changes? No   Does the patient have all medications ordered at discharge? No  [Patient needs advair ]   What is keeping the patient from filling the prescriptions? Pre-authorization in progress  [Advair has prior auth ]   Nursing Interventions Nurse provided patient education,  Nurse advised patient to call provider,  No intervention needed   Prescription comments Charlene ( sig other works for his PCP and will have PA done on med) .    Is the patient taking all medications as directed (includes completed medication regime)? No   Nursing Interventions Nurse provided patient education,  Advised patient to call provider   Does the patient have a primary care provider?  Yes   Comments regarding PCP Sig other will set up PCP appt (Dr Carpenter)   Does the patient have an appointment with their PCP or specialist within 7 days of discharge? No   What is preventing the patient from scheduling follow up appointments within 7 days of discharge? Haven't had time   Nursing Interventions Educated patient on importance of making appointment,  Advised patient to make appointment   Has the patient kept scheduled appointments due by today? N/A   What DME was ordered? [facility] will provide o2.   Has all DME been delivered? Yes   DME comments [facility] at [facility]   Psychosocial issues? No   Did  the patient receive a copy of their discharge instructions? Yes   Did the patient receive a copy of COVID-19 specific instructions? Yes   Nursing interventions Reviewed instructions with patient   What is the patient's perception of their health status since discharge? Improving   Does the patient have any of the following symptoms? Cough   Nursing Interventions Nurse provided patient education   Pulse Ox monitoring Intermittent   Pulse Ox device source Hospital   O2 Sat comments Sats are in 90's with 02 at 2LBNC   O2 Sat: education provided Sat levels,  Monitoring frequency,  When to seek care   Is the patient/caregiver able to teach back steps to recovery at home? Set small, achievable goals for return to baseline health,  Rest and rebuild strength, gradually increase activity,  Make a list of questions for provider's appointment   If the patient is a current smoker, are they able to teach back resources for cessation? Not a smoker   Is the patient/caregiver able to teach back the hierarchy of who to call/visit for symptoms/problems? PCP, Specialist, Home health nurse, Urgent Care, ED, 911 Yes   COVID-19 call completed? Yes          Eamon Marin RN

## 2021-11-17 NOTE — CASE MANAGEMENT/SOCIAL WORK
Case Management Discharge Note      Final Note: DC'd home 11/16 with [facility] providing O2.    Provided Post Acute Provider List?: Refused  Refused Provider List Comment: Pt declined  Provided Post Acute Provider Quality & Resource List?: Refused  Refused Quality and Resource List Comment: Pt declined    Selected Continued Care - Discharged on 11/16/2021 Admission date: 11/8/2021 - Discharge disposition: Home or Self Care    Destination    No services have been selected for the patient.              Durable Medical Equipment Coordination complete.    Service Provider Selected Services Address Phone Fax Patient Preferred    [facility]  Durable Medical Equipment [id number] [id number] [phone] --                       Transportation Services  Private: Car    Final Discharge Disposition Code: 01 - home or self-care

## 2021-11-18 ENCOUNTER — READMISSION MANAGEMENT (OUTPATIENT)
Dept: CALL CENTER | Facility: HOSPITAL | Age: 41
End: 2021-11-18

## 2021-11-18 NOTE — OUTREACH NOTE
COVID-19 Week 1 Survey      Responses   [facility] patient discharged from? [place]   Does the patient have one of the following disease processes/diagnoses(primary or secondary)? COVID-19   COVID-19 underlying condition? None   Call Number Call 2   Week 1 Call successful? Yes   Call start time 1335   Call end time 1343   Discharge diagnosis Pneumonia due to COVID   Is patient permission given to speak with other caregiver? Yes   Person spoke with today (if not patient) and relationship charlene/ sig other   Meds reviewed with patient/caregiver? Yes   Is the patient having any side effects they believe may be caused by any medication additions or changes? No   Does the patient have all medications ordered at discharge? Yes  [using Symbicort ]   Prescription comments Symbicort is being used in place of Advair   Is the patient taking all medications as directed (includes completed medication regime)? Yes   Does the patient have a primary care provider?  Yes   Comments regarding PCP Sig other will set up PCP appt (Dr Carpenter)   Does the patient have an appointment with their PCP or specialist within 7 days of discharge? No   What is preventing the patient from scheduling follow up appointments within 7 days of discharge? Haven't had time   Nursing Interventions Educated patient on importance of making appointment,  Advised patient to make appointment   Has the patient kept scheduled appointments due by today? N/A   What DME was ordered? [employer] will provide o2.   Has all DME been delivered? Yes   DME comments 02 at [facility] took off for an hour today just to see how he would do. Sats 92% but he replaced as he become alittle winded   Psychosocial issues? No   Did the patient receive a copy of their discharge instructions? Yes   Did the patient receive a copy of COVID-19 specific instructions? Yes   Nursing interventions Reviewed instructions with patient   What is the patient's perception of their health status  since discharge? Improving   Does the patient have any of the following symptoms? Cough   Nursing Interventions Nurse provided patient education   Pulse Ox monitoring Intermittent   Pulse Ox device source Hospital   O2 Sat comments Sats are in 90's with 02 at [facility]   Is the patient/caregiver able to teach back steps to recovery at home? Set small, achievable goals for return to baseline health,  Rest and rebuild strength, gradually increase activity,  Make a list of questions for provider's appointment   If the patient is a current smoker, are they able to teach back resources for cessation? Not a smoker   Is the patient/caregiver able to teach back the hierarchy of who to call/visit for symptoms/problems? PCP, Specialist, Home health nurse, Urgent Care, ED, 911 Yes   COVID-19 call completed? Yes          Eamon Marin RN

## 2021-11-19 ENCOUNTER — READMISSION MANAGEMENT (OUTPATIENT)
Dept: CALL CENTER | Facility: HOSPITAL | Age: 41
End: 2021-11-19

## 2021-11-19 NOTE — OUTREACH NOTE
COVID-19 Week 1 Survey      Responses   [facility] patient discharged from? [place]   Does the patient have one of the following disease processes/diagnoses(primary or secondary)? COVID-19   COVID-19 underlying condition? None   Call Number Call 3   Week 1 Call successful? Yes   Call start time 1550   Call end time 1557   Discharge diagnosis Pneumonia due to COVID   Person spoke with today (if not patient) and relationship charlene/ sig other   Meds reviewed with patient/caregiver? Yes   Does the patient have all medications ordered at discharge? No   What is keeping the patient from filling the prescriptions? Pre-authorization in progress   Nursing Interventions No intervention needed   Prescription comments Symbicort is being used in place of Advair. Routed message to CM.   Is the patient taking all medications as directed (includes completed medication regime)? Yes   Does the patient have a primary care provider?  No   Has the patient kept scheduled appointments due by today? N/A   What is the patient's perception of their health status since discharge? Improving   Does the patient have any of the following symptoms? Loss of taste/smell   Pulse Ox monitoring Intermittent   O2 Sat comments 98% on 2 L of O2    Is the patient/caregiver able to teach back steps to recovery at home? Rest and rebuild strength, gradually increase activity,  Eat a well-balance diet   COVID-19 call completed? Yes          Allyn Lozada RN

## 2021-11-22 NOTE — OUTREACH NOTE
Case Management Call Center Follow-up      Responses   [facility] Call Center Tracking Reason? Medication Clarification   Has the Call Center Case Management Follow-up issue been resolved? Yes   Follow-up Comments I tried to get the advair approved again today but it was still denied.  I called the patient Flo and explained that we had tried mulitple inhalers and insurance keeps denying them.  He stated he was doing better and seemed to be ok right now.  I told him to follow up with his PCP if he starts feeling worse or have any respiratory symptoms.          Shannon Epley, RN    11/22/2021, 15:40 EST

## 2021-11-23 ENCOUNTER — READMISSION MANAGEMENT (OUTPATIENT)
Dept: CALL CENTER | Facility: HOSPITAL | Age: 41
End: 2021-11-23

## 2021-11-23 NOTE — OUTREACH NOTE
COVID-19 Week 2 Survey      Responses   [facility] patient discharged from? [place]   Does the patient have one of the following disease processes/diagnoses(primary or secondary)? COVID-19   COVID-19 underlying condition? None   Call Number Call 1   COVID-19 Week 2: Call 1 attempt successful? No   Discharge diagnosis Pneumonia due to COVID-19 virus          Negar Collins RN

## 2021-11-30 ENCOUNTER — READMISSION MANAGEMENT (OUTPATIENT)
Dept: CALL CENTER | Facility: HOSPITAL | Age: 41
End: 2021-11-30

## 2021-11-30 NOTE — OUTREACH NOTE
COVID-19 Week 3 Survey      Responses   [facility] patient discharged from? [place]   Does the patient have one of the following disease processes/diagnoses(primary or secondary)? COVID-19   COVID-19 underlying condition? None   Call Number Call 1   COVID-19 Week 3: Call 1 attempt successful? Yes   Call start time 1258   Call end time 1303   Discharge diagnosis Pneumonia due to COVID-19 virus   Is patient permission given to speak with other caregiver? Yes   Meds reviewed with patient/caregiver? Yes   Is the patient having any side effects they believe may be caused by any medication additions or changes? No   Does the patient have all medications ordered at discharge? Yes   Prescription comments Symbicort is being used in place of Advair. Routed message to CM.   Is the patient taking all medications as directed (includes completed medication regime)? Yes   Does the patient have a primary care provider?  Yes   Does the patient have an appointment with their PCP or specialist within 7 days of discharge? Yes   Has the patient kept scheduled appointments due by today? Yes   Comments He will be back to work Feb- He is on 02 at 2 liters per NC - 95% and 85% with activity.  He is off on FMLA.    Did the patient receive a copy of their discharge instructions? Yes   Did the patient receive a copy of COVID-19 specific instructions? Yes   Nursing interventions Reviewed instructions with patient   What is the patient's perception of their health status since discharge? Improving  [On 02 at 2 liters per NC. sat 95%- with activity it drops to 85 %. ]   Does the patient have any of the following symptoms? Loss of taste/smell,  Shortness of breath   Nursing Interventions Nurse provided patient education   Pulse Ox monitoring Intermittent   Pulse Ox device source Hospital   O2 Sat comments 95% 2 liters per NC. with activity it drops to 85% - MD aware - He is off work until Feb. He states it does come back up.    O2 Sat:  education provided Sat levels,  Monitoring frequency,  When to seek care   Is the patient/caregiver able to teach back steps to recovery at home? Rest and rebuild strength, gradually increase activity,  Eat a well-balance diet   If the patient is a current smoker, are they able to teach back resources for cessation? Not a smoker   Is the patient/caregiver able to teach back the hierarchy of who to call/visit for symptoms/problems? PCP, Specialist, Home health nurse, Urgent Care, ED, 911 Yes   COVID-19 call completed? Yes          Lolly Alanis RN

## 2021-12-07 ENCOUNTER — READMISSION MANAGEMENT (OUTPATIENT)
Dept: CALL CENTER | Facility: HOSPITAL | Age: 41
End: 2021-12-07

## 2021-12-07 NOTE — OUTREACH NOTE
COVID-19 Week 4 Survey      Responses   [facility] patient discharged from? [place]   Does the patient have one of the following disease processes/diagnoses(primary or secondary)? COVID-19   COVID-19 underlying condition? None   Call Number Call 1   COVID-19 Week 4: Call 1 attempt successful? Yes   Call start time 1425   Call end time 1429   Discharge diagnosis Pneumonia due to COVID-19 virus   Is the patient taking all medications as directed (includes completed medication regime)? Yes   Has the patient kept scheduled appointments due by today? Yes   What is the patient's perception of their health status since discharge? Improving   Does the patient have any of the following symptoms? Shortness of breath   Pulse Ox monitoring Intermittent   Pulse Ox device source Hospital   O2 Sat comments 2liters 94-98% if goes off O2 then drops 84 % in shower   Is the patient interested in additional calls from an ambulatory ?  NOTE:  applies to high risk patients requiring additional follow-up. No   Did the patient feel the follow up calls were helpful during their recovery period? Yes   Was the number of calls appropriate? Yes   Wrap up additional comments Continues to require O2 to maintain sat above 90%. Concerning for long haul covid symptoms.  Taste and smell are back.            Celeste Michael RN

## 2022-01-18 ENCOUNTER — TRANSCRIBE ORDERS (OUTPATIENT)
Dept: ADMINISTRATIVE | Facility: HOSPITAL | Age: 42
End: 2022-01-18

## 2022-01-18 DIAGNOSIS — R91.8 PULMONARY INFILTRATES: Primary | ICD-10-CM

## 2022-02-07 ENCOUNTER — APPOINTMENT (OUTPATIENT)
Dept: CT IMAGING | Facility: HOSPITAL | Age: 42
End: 2022-02-07

## 2022-03-02 ENCOUNTER — HOSPITAL ENCOUNTER (OUTPATIENT)
Dept: CT IMAGING | Facility: HOSPITAL | Age: 42
Discharge: HOME | End: 2022-03-02
Admitting: NURSE PRACTITIONER

## 2022-03-02 DIAGNOSIS — R91.8 PULMONARY INFILTRATES: ICD-10-CM

## 2022-03-02 PROCEDURE — 71250 CT THORAX DX C-: CPT
